# Patient Record
(demographics unavailable — no encounter records)

---

## 2018-08-30 NOTE — EMERGENCY ROOM REPORT
History of Present Illness


General


Chief Complaint:  General Complaint


Source:  Patient





Present Illness


HPI


57-year-old male, history of some psychiatric disorder, said that he woke up 

and he believes that someone put formaldehyde in his mouth.  He was at home, 

with a family member.  States that his dad  2 weeks ago, they used 

formaldehyde for his body, he says that he woke up and he smelled formaldehyde 

on his lips.  He says that he does not believe anyone poisoned him, but does 

not know why it would smelling formaldehyde in his lips.  He is currently just 

complaining of generalized weakness.  No chest pain or abdominal pain.  Some 

nausea but no vomiting.  No diarrhea.


Allergies:  


Coded Allergies:  


     No Known Allergies (Unverified , 18)





Patient History


Past Medical History:  see triage record


Past Surgical History:  none


Pertinent Family History:  none


Reviewed Nursing Documentation:  PMH: Agreed; PSxH: Agreed





Nursing Documentation-PM


Past Medical History:  No History, Except For


History Of Psychiatric Problem:  Yes - anxiety, depression


Hx Neurological Problems:  Yes





Review of Systems


All Other Systems:  negative except mentioned in HPI





Physical Exam





Vital Signs








  Date Time  Temp Pulse Resp B/P (MAP) Pulse Ox O2 Delivery O2 Flow Rate FiO2


 


18 18:48 98.4 84 16 108/76 100 Room Air  





 98.4       








Sp02 EP Interpretation:  reviewed, normal


General Appearance:  other - Withdrawn middle aged male, very anxious appearing

, however he is answering questions appropriately, nontoxic


Head:  normocephalic, atraumatic


Eyes:  bilateral eye normal inspection, bilateral eye PERRL, bilateral eye EOMI


ENT:  normal ENT inspection, normal pharynx, normal voice, moist mucus membranes


Neck:  normal inspection, full range of motion, supple


Respiratory:  normal inspection, lungs clear, normal breath sounds, no 

respiratory distress, no retraction, no wheezing, speaking full sentences, 

chest symmetrical


Cardiovascular #1:  normal inspection, regular rate, rhythm, no edema, normal 

capillary refill


Cardiovascular #2:  2+ radial (R), 2+ radial (L)


Gastrointestinal:  normal inspection, non tender, soft, non-distended, no 

guarding


Genitourinary:  no CVA tenderness


Musculoskeletal:  normal inspection, back normal, normal range of motion, non-

tender


Neurologic:  normal inspection, alert, oriented x3, responsive, motor strength/

tone normal, sensory intact, normal gait, speech normal


Psychiatric:  normal inspection, judgement/insight normal, memory normal


Skin:  normal inspection, normal color, no rash, warm/dry, well hydrated, 

normal turgor





Medical Decision Making


Diagnostic Impression:  


 Primary Impression:  


 Generalized weakness


 Additional Impressions:  


 Dehydration


 Decreased oral intake


ER Course


57-year-old male, feeling like he has smoke formaldehyde on his lips, however 

denies having any formaldehyde or any substance like that in his home.  No SI 

or HI





DDX:


Possibly anxiety, rule out toxic overdose although there denies any actual 

history of this





Plan:


Obtain labs, observation





ER course:


Patient has remained stable during ED stay.


He was very anxious upon arrival, so 2 mg Ativan was given


Since then he has been sleeping comfortably


Labs on her otherwise unremarkable with the exception of some mild dehydration


His mother came, says that he has a history of candida and she read an article 

where Candida can cause acidosis in the blood, so she gave him baking soda 

mixed with water this was 24 hours ago


labs are showing dehydration








Disposition:


Patient is to be admitted to Dr Real, med surg








Please note that this Emergency Department Report was dictated using RE[employer] technology software, occasionally this can lead to 

erroneous entry secondary to interpretation by the dictation equipment





Laboratory Tests








Test


  18


20:00 18


20:18


 


White Blood Count


  10.4 K/UL


(4.8-10.8) 


 


 


Red Blood Count


  3.47 M/UL


(4.70-6.10)  L 


 


 


Hemoglobin


  10.5 G/DL


(14.2-18.0)  L 


 


 


Hematocrit


  31.1 %


(42.0-52.0)  L 


 


 


Mean Corpuscular Volume 90 FL (80-99)   


 


Mean Corpuscular Hemoglobin


  30.1 PG


(27.0-31.0) 


 


 


Mean Corpuscular Hemoglobin


Concent 33.6 G/DL


(32.0-36.0) 


 


 


Red Cell Distribution Width


  11.5 %


(11.6-14.8)  L 


 


 


Platelet Count


  217 K/UL


(150-450) 


 


 


Mean Platelet Volume


  5.6 FL


(6.5-10.1)  L 


 


 


Neutrophils (%) (Auto)


  76.1 %


(45.0-75.0)  H 


 


 


Lymphocytes (%) (Auto)


  14.9 %


(20.0-45.0)  L 


 


 


Monocytes (%) (Auto)


  8.2 %


(1.0-10.0) 


 


 


Eosinophils (%) (Auto)


  0.1 %


(0.0-3.0) 


 


 


Basophils (%) (Auto)


  0.7 %


(0.0-2.0) 


 


 


Sodium Level


  141 MMOL/L


(136-145) 


 


 


Potassium Level


  3.2 MMOL/L


(3.5-5.1)  L 


 


 


Chloride Level


  102 MMOL/L


() 


 


 


Carbon Dioxide Level


  35 MMOL/L


(21-32)  H 


 


 


Anion Gap


  4 mmol/L


(5-15)  L 


 


 


Blood Urea Nitrogen


  23 mg/dL


(7-18)  H 


 


 


Creatinine


  0.7 MG/DL


(0.55-1.30) 


 


 


Estimate Glomerular


Filtration Rate > 60 mL/min


(>60) 


 


 


Glucose Level


  107 MG/DL


()  H 


 


 


Calcium Level


  9.0 MG/DL


(8.5-10.1) 


 


 


Total Bilirubin


  1.3 MG/DL


(0.2-1.0)  H 


 


 


Direct Bilirubin


  0.2 MG/DL


(0.0-0.3) 


 


 


Aspartate Amino Transferase


(AST) 55 U/L (15-37)


H 


 


 


Alanine Aminotransferase (ALT)


  11 U/L (12-78)


L 


 


 


Alkaline Phosphatase


  63 U/L


() 


 


 


Total Creatine Kinase


  1130 U/L


()  H 


 


 


Total Protein


  6.8 G/DL


(6.4-8.2) 


 


 


Albumin


  3.4 G/DL


(3.4-5.0) 


 


 


Globulin 3.4 g/dL   


 


Albumin/Globulin Ratio 1.0 (1.0-2.7)   


 


Salicylates Level


  1.5 ug/mL


(2.8-20)  L 


 


 


Acetaminophen Level


  < 2 MCG/ML


(10-30)  L 


 


 


Serum Alcohol < 3 mg/dL   


 


Arterial Blood pH


  


  7.542


(7.350-7.450)


 


Arterial Blood Partial


Pressure CO2 


  36.2 mmHg


(35.0-45.0)


 


Arterial Blood Partial


Pressure O2 


  102.9 mmHg


(75.0-100.0)  H


 


Arterial Blood HCO3


  


  30.4 mmol/L


(22.0-26.0)  H


 


Arterial Blood Oxygen


Saturation 


  97.4 %


(92.0-98.0)


 


Arterial Blood Base Excess  7.5  


 


Mart Test  Positive  











Last Vital Signs








  Date Time  Temp Pulse Resp B/P (MAP) Pulse Ox O2 Delivery O2 Flow Rate FiO2


 


18 19:10 98.4 85 16 117/67 100 Room Air  





 98.4       








Disposition:  ADMITTED AS INPATIENT


Condition:  Serious


Referrals:  


NOT CHOSEN IPA/MD,REFERRING (PCP)











Barrington Carmen M.D. Aug 30, 2018 19:33

## 2018-08-31 NOTE — CONSULTATION
History of Present Illness


General


Date patient seen:  Aug 31, 2018


Chief Complaint:  General Complaint





Present Illness


HPI


57-year-old male, history of some psychiatric disorder, said that he woke up 

and he believes that someone put formaldehyde in his mouth.    He says that he 

does not believe anyone poisoned him, but does not know why it would smelling 

formaldehyde in his lips.  He is currently just complaining of generalized 

weakness.  He was mildly dehydrated, and he seemed not be able to take care of 

himself and need probably placement.


Allergies:  


Coded Allergies:  


     No Known Allergies (Unverified , 8/30/18)





Medication History


Scheduled


Carbidopa/Levodopa  Mg* (Sinemet  Mg Tablet*), 1 TAB ORAL THREE 

TIMES A DAY, (Reported)





Miscellaneous Medications


Unable to Obtain Medications (Unable To Obtain Meds), (Reported)





Patient History


Healthcare decision maker





Resuscitation status


Full Code


Advanced Directive on File








Past Medical/Surgical History


Past Medical/Surgical History:  


(1) Schizoaffective disorder





Review of Systems


All Other Systems:  negative except mentioned in HPI





Physical Exam


General Appearance:  cachetic


Lines, tubes and drains:  peripheral


Neck:  non-tender, normal alignment


Respiratory/Chest:  chest wall non-tender, normal breath sounds


Breasts:  no masses


Cardiovascular/Chest:  normal peripheral pulses


Abdomen:  normal bowel sounds, hyperactive bowel sounds


Extremities:  normal range of motion





Last 24 Hour Vital Signs








  Date Time  Temp Pulse Resp B/P (MAP) Pulse Ox O2 Delivery O2 Flow Rate FiO2


 


8/31/18 12:00 97.9 73 18 111/67 (82) 99   





 97.9       


 


8/31/18 08:00 97.5 79 19 110/68 (82) 98   





 97.5       


 


8/31/18 08:00      Room Air  


 


8/31/18 04:00 98.9 73 19 113/58 (76) 98   





 98.9       


 


8/31/18 00:00 98.3 77 19 96/55 (69) 97   





 98.3       


 


8/30/18 23:22      Room Air  


 


8/30/18 23:11 98.1 82 16 117/71 100 Room Air  





 98.1       


 


8/30/18 22:45 98.1 82 16 117/71 100 Room Air  





 98.1       


 


8/30/18 20:14 98.5 80 18 120/68 100 Room Air  





 98.5       


 


8/30/18 19:10 98.4 85 16 117/67 100 Room Air  





 98.4       


 


8/30/18 18:48 98.4 84 16 108/76 100 Room Air  





 98.4       

















Intake and Output  


 


 8/30/18 8/31/18





 19:00 07:00


 


Intake Total  1600 ml


 


Output Total  500 ml


 


Balance  1100 ml


 


  


 


Intake IV Total  1600 ml


 


Output Urine Total  500 ml


 


# Voids  1











Laboratory Tests








Test


  8/30/18


20:00 8/30/18


20:18 8/31/18


05:50


 


White Blood Count


  10.4 K/UL


(4.8-10.8) 


  6.6 K/UL


(4.8-10.8)


 


Red Blood Count


  3.47 M/UL


(4.70-6.10)  L 


  3.40 M/UL


(4.70-6.10)  L


 


Hemoglobin


  10.5 G/DL


(14.2-18.0)  L 


  10.2 G/DL


(14.2-18.0)  L


 


Hematocrit


  31.1 %


(42.0-52.0)  L 


  30.2 %


(42.0-52.0)  L


 


Mean Corpuscular Volume 90 FL (80-99)    89 FL (80-99)  


 


Mean Corpuscular Hemoglobin


  30.1 PG


(27.0-31.0) 


  29.9 PG


(27.0-31.0)


 


Mean Corpuscular Hemoglobin


Concent 33.6 G/DL


(32.0-36.0) 


  33.7 G/DL


(32.0-36.0)


 


Red Cell Distribution Width


  11.5 %


(11.6-14.8)  L 


  11.5 %


(11.6-14.8)  L


 


Platelet Count


  217 K/UL


(150-450) 


  186 K/UL


(150-450)


 


Mean Platelet Volume


  5.6 FL


(6.5-10.1)  L 


  5.8 FL


(6.5-10.1)  L


 


Neutrophils (%) (Auto)


  76.1 %


(45.0-75.0)  H 


  71.8 %


(45.0-75.0)


 


Lymphocytes (%) (Auto)


  14.9 %


(20.0-45.0)  L 


  18.4 %


(20.0-45.0)  L


 


Monocytes (%) (Auto)


  8.2 %


(1.0-10.0) 


  8.4 %


(1.0-10.0)


 


Eosinophils (%) (Auto)


  0.1 %


(0.0-3.0) 


  0.8 %


(0.0-3.0)


 


Basophils (%) (Auto)


  0.7 %


(0.0-2.0) 


  0.7 %


(0.0-2.0)


 


Sodium Level


  141 MMOL/L


(136-145) 


  142 MMOL/L


(136-145)


 


Potassium Level


  3.2 MMOL/L


(3.5-5.1)  L 


  3.2 MMOL/L


(3.5-5.1)  L


 


Chloride Level


  102 MMOL/L


() 


  106 MMOL/L


()


 


Carbon Dioxide Level


  35 MMOL/L


(21-32)  H 


  32 MMOL/L


(21-32)


 


Anion Gap


  4 mmol/L


(5-15)  L 


  4 mmol/L


(5-15)  L


 


Blood Urea Nitrogen


  23 mg/dL


(7-18)  H 


  15 mg/dL


(7-18)


 


Creatinine


  0.7 MG/DL


(0.55-1.30) 


  0.7 MG/DL


(0.55-1.30)


 


Estimat Glomerular Filtration


Rate > 60 mL/min


(>60) 


  > 60 mL/min


(>60)


 


Glucose Level


  107 MG/DL


()  H 


  153 MG/DL


()  H


 


Calcium Level


  9.0 MG/DL


(8.5-10.1) 


  8.4 MG/DL


(8.5-10.1)  L


 


Total Bilirubin


  1.3 MG/DL


(0.2-1.0)  H 


  1.3 MG/DL


(0.2-1.0)  H


 


Direct Bilirubin


  0.2 MG/DL


(0.0-0.3) 


  0.2 MG/DL


(0.0-0.3)


 


Aspartate Amino Transf


(AST/SGOT) 55 U/L (15-37)


H 


  43 U/L (15-37)


H


 


Alanine Aminotransferase


(ALT/SGPT) 11 U/L (12-78)


L 


  23 U/L (12-78)


 


 


Alkaline Phosphatase


  63 U/L


() 


  56 U/L


()


 


Total Creatine Kinase


  1130 U/L


()  H 


  


 


 


Total Protein


  6.8 G/DL


(6.4-8.2) 


  5.9 G/DL


(6.4-8.2)  L


 


Albumin


  3.4 G/DL


(3.4-5.0) 


  2.8 G/DL


(3.4-5.0)  L


 


Globulin 3.4 g/dL    3.1 g/dL  


 


Albumin/Globulin Ratio


  1.0 (1.0-2.7)  


  


  0.9 (1.0-2.7)


L


 


Salicylates Level


  1.5 ug/mL


(2.8-20)  L 


  


 


 


Acetaminophen Level


  < 2 MCG/ML


(10-30)  L 


  


 


 


Serum Alcohol < 3 mg/dL    


 


Arterial Blood pH


  


  7.542


(7.350-7.450) 


 


 


Arterial Blood Partial


Pressure CO2 


  36.2 mmHg


(35.0-45.0) 


 


 


Arterial Blood Partial


Pressure O2 


  102.9 mmHg


(75.0-100.0)  H 


 


 


Arterial Blood HCO3


  


  30.4 mmol/L


(22.0-26.0)  H 


 


 


Arterial Blood Oxygen


Saturation 


  97.4 %


(92.0-98.0) 


 


 


Arterial Blood Base Excess  7.5   


 


Mart Test  Positive   


 


Phosphorus Level


  


  


  2.6 MG/DL


(2.5-4.9)


 


Magnesium Level


  


  


  2.0 MG/DL


(1.8-2.4)








Height (Feet):  5


Height (Inches):  6.00


Weight (Pounds):  129


Medications





Current Medications








 Medications


  (Trade)  Dose


 Ordered  Sig/Juno


 Route


 PRN Reason  Start Time


 Stop Time Status Last Admin


Dose Admin


 


 Acetaminophen


  (Tylenol)  650 mg  Q6H  PRN


 ORAL


 Mild Pain/Temp > 100.0  8/31/18 00:15


 9/30/18 00:14   


 


 


 Acetaminophen/


 Hydrocodone Bitart


  (Norco 5/325)  1 tab  Q6H  PRN


 ORAL


 MOD-SEV PAIN 4-10  8/31/18 00:15


 9/7/18 00:14   


 


 


 Dextrose/


 Electrolytes  1,000 ml @ 


 100 mls/hr  Q10H


 IV


   8/31/18 00:15


 9/30/18 00:14  8/31/18 11:05


 


 


 Heparin Sodium


  (Porcine)


  (Heparin 5000


 units/ml)  5,000 units  EVERY 12  HOURS


 SUBQ


   8/31/18 09:00


 9/30/18 08:59  8/31/18 08:07


 


 


 Lorazepam


  (Ativan)  1 mg  Q6H  PRN


 ORAL


 For Anxiety  8/31/18 11:30


 9/7/18 11:29   


 


 


 Ondansetron HCl


  (Zofran)  4 mg  Q4H  PRN


 IVP


 Nausea & Vomiting  8/31/18 00:15


 9/30/18 00:14   


 











Assessment/Plan


Problem List:  


(1) Dehydration


ICD Codes:  E86.0 - Dehydration


SNOMED:  53540226


(2) unable to care for himself 


(3) Schizoaffective disorder


ICD Codes:  F25.9 - Schizoaffective disorder, unspecified


SNOMED:  98418444


(4) Generalized weakness


ICD Codes:  R53.1 - Weakness


SNOMED:  14297943


Assessment/Plan


Iv fluids


psych evaluation


check electrolytes


K supplement


pt/ot 


social service consult











Bala Wisdom MD Aug 31, 2018 12:38

## 2018-08-31 NOTE — HISTORY & PHYSICAL
History and Physical


History & Physicial


Dictated for Int Med-Dr Real no. [id number].











Pritesh Samuels MD Aug 31, 2018 20:04

## 2018-08-31 NOTE — HISTORY AND PHYSICAL REPORT
DATE OF ADMISSION:  2018



CHIEF COMPLAINT:  The patient is a 57-year-old male, who presents with a

chief complaint of acute psychosis.



HISTORY OF PRESENT ILLNESS:  The patient himself is unable to contribute

much to the history and physical.  The patient is having hallucinations.

The patient apparently lives at home with family.  The patient states his

father  2 weeks ago and they used formaldehyde to preserve his body.

The patient states he woke up with formaldehyde on his lips.  The patient

states that he does not know if of anybody poisoned him or if he just got

formaldehyde on his lips.  The patient is admitted for acute psychosis.



PAST MEDICAL HISTORY:  Unknown.



PAST SURGICAL HISTORY:  Unknown.



CURRENT MEDICATIONS:  Sinemet 25/100 one tablet p.o. 3 times daily.



ALLERGIES:  No known drug allergies.



SOCIAL HISTORY:  The patient lives at home with his family.  The patient

denies tobacco or alcohol use.



REVIEW OF SYSTEMS:  Unable to assess secondary to the patient's mental

status.



PHYSICAL EXAMINATION:

VITAL SIGNS:  Temperature 98.3, respirations 19, pulse 77, and blood

pressure 96/55.

GENERAL:  The patient is a well-developed and well-nourished male, in no

apparent distress.

HEENT:  Eyes, pupils are equal and responsive to light and accommodation.

Extraocular movements are intact.

NECK:  Supple without lymphadenopathy.

CHEST:  Lungs are clear to auscultation bilaterally without wheezes or

rales.

CARDIOVASCULAR:  Regular rhythm and rate.  S1 and S2 are normal without

murmurs, rubs, or gallops.

ABDOMEN:  Soft, nontender, and nondistended.  Positive bowel sounds.  No

evidence of hepatosplenomegaly.  Currently, no rebounding or guarding

noted.

EXTREMITIES:  Negative for clubbing, cyanosis, or edema.

RECTAL/GENITAL:  Deferred.

NEUROLOGIC:  Cranial nerves II through XII are grossly intact without focal

deficits.  Motor strength is 5/5 bilaterally.  Deep tendon reflexes are 2+

plantar.



LABORATORY STUDIES:  WBC 10.4, hemoglobin 10.5, hematocrit 31.1, and

platelets 270,000.  Sodium 141, potassium 3.2, chloride 102, CO2 35, BUN

23, creatinine 0.7, and glucose 107.  Urine toxicology was negative.



ASSESSMENT:  This is a 57-year-old male.



1. Acute psychosis, not otherwise specified.

2. Parkinson's disease.



TREATMENT:

1. Psychosis, not otherwise specified.  A Psychiatric consultation has

been obtained with Dr. Lima.  We will follow recommendations of Dr. Lima.

2. Parkinson's disease.  Continue Sinemet as above this.









  ______________________________________________

  Pritesh Samuels M.D.





DR:  VICTORIA

D:  2018 20:03

T:  2018 21:15

JOB#:  [id number]

CC:

## 2018-09-01 NOTE — CONSULTATION
History of Present Illness


General


Date patient seen:  Aug 31, 2018


Chief Complaint:  General Complaint





Present Illness


HPI


57-year-old male, who presents with a chief complaint of acute psychosis. the 

pt has hx of depression and anxiety he was perseverating about his medical 

condition and was paranoid


Allergies:  


Uncoded Allergies:  


     LEVODOPA, CARBIDOPA (Allergy, Unknown, 9/1/18)





Medication History


Scheduled


Albuterol Sulfate* (Proair Hfa*), 1 PUFF INH THREE TIMES A DAY, (Reported)


Carbidopa/Levodopa  Mg* (Sinemet  Mg Tablet*), 1 TAB ORAL THREE 

TIMES A DAY, (Reported)


Carvedilol* (Carvedilol*), 6.25 MG ORAL EVERY 12 HOURS, (Reported)


Clonazepam* (Klonopin*), 0.5 MG ORAL BID, (Reported)


Docusate Sodium* (Docusate Sodium*), 100 MG ORAL TWICE A DAY, (Reported)


Entacapone (Entacapone), 200 MG PO DAILY, (Reported)


Fluticasone Propionate (Fluticasone Propionate), 1 SPRAY NASAL DAILY, (Reported)


Fluticasone Propionate* (Fluticasone Propionate*), 1 SPRAY NASAL DAILY, (

Reported)


Pantoprazole* (Pantoprazole*), 40 MG ORAL DAILY, (Reported)


Quetiapine Fumarate* (Quetiapine Fumarate*), 50 MG ORAL DAILY, (Reported)





Scheduled PRN


Hydrocodone Bit/Acetaminophen 5-325* (Norco 5-325*), 1 TAB ORAL Q6H PRN for For 

Pain, (Reported)


Methocarbamol* (Methocarbamol*), 750 MG ORAL THREE TIMES A DAY PRN for For Pain,

 (Reported)





Miscellaneous Medications


Unable to Obtain Medications (Unable To Obtain Meds), (Reported)





Patient History


History Provided By:  Patient, Medical Record, PMD


Healthcare decision maker





Resuscitation status


Full Code


Advanced Directive on File








Review of Systems


Psychiatric:  Reports: prior hx, anxiety, depressed feelings, emotional problems





Physical Exam


General Appearance:  no apparent distress, alert





Last 24 Hour Vital Signs








  Date Time  Temp Pulse Resp B/P (MAP) Pulse Ox O2 Delivery O2 Flow Rate FiO2


 


9/1/18 12:00 98.2 75 18 115/70 (85) 99   





 98.2       


 


9/1/18 08:12      Room Air  


 


9/1/18 08:00 99.0 76 18 103/67 (79) 99   





 99.0       


 


9/1/18 04:00 98.2 80 18 127/75 (92) 99   





 98.2       


 


9/1/18 00:00 98.2 79 18 120/72 (88) 99   





 98.2       

















Intake and Output  


 


 8/31/18 9/1/18





 19:00 07:00


 


Intake Total  490 ml


 


Output Total  950 ml


 


Balance  -460 ml


 


  


 


Intake Oral  490 ml


 


Output Urine Total  950 ml


 


# Voids  2


 


# Bowel Movements 2 











Laboratory Tests








Test


  9/1/18


07:20


 


White Blood Count


  4.8 K/UL


(4.8-10.8)


 


Red Blood Count


  3.92 M/UL


(4.70-6.10)  L


 


Hemoglobin


  11.5 G/DL


(14.2-18.0)  L


 


Hematocrit


  34.5 %


(42.0-52.0)  L


 


Mean Corpuscular Volume 88 FL (80-99)  


 


Mean Corpuscular Hemoglobin


  29.5 PG


(27.0-31.0)


 


Mean Corpuscular Hemoglobin


Concent 33.4 G/DL


(32.0-36.0)


 


Red Cell Distribution Width


  11.3 %


(11.6-14.8)  L


 


Platelet Count


  213 K/UL


(150-450)


 


Mean Platelet Volume


  5.8 FL


(6.5-10.1)  L


 


Neutrophils (%) (Auto)


  57.2 %


(45.0-75.0)


 


Lymphocytes (%) (Auto)


  29.0 %


(20.0-45.0)


 


Monocytes (%) (Auto)


  11.6 %


(1.0-10.0)  H


 


Eosinophils (%) (Auto)


  1.3 %


(0.0-3.0)


 


Basophils (%) (Auto)


  0.8 %


(0.0-2.0)


 


Sodium Level


  141 MMOL/L


(136-145)


 


Potassium Level


  3.6 MMOL/L


(3.5-5.1)


 


Chloride Level


  104 MMOL/L


()


 


Carbon Dioxide Level


  29 MMOL/L


(21-32)


 


Anion Gap


  8 mmol/L


(5-15)


 


Blood Urea Nitrogen


  10 mg/dL


(7-18)


 


Creatinine


  0.6 MG/DL


(0.55-1.30)


 


Estimat Glomerular Filtration


Rate > 60 mL/min


(>60)


 


Glucose Level


  100 MG/DL


()


 


Calcium Level


  8.9 MG/DL


(8.5-10.1)


 


Phosphorus Level


  2.7 MG/DL


(2.5-4.9)


 


Magnesium Level


  2.0 MG/DL


(1.8-2.4)


 


Total Bilirubin


  0.9 MG/DL


(0.2-1.0)


 


Aspartate Amino Transf


(AST/SGOT) 39 U/L (15-37)


H


 


Alanine Aminotransferase


(ALT/SGPT) 14 U/L (12-78)


 


 


Alkaline Phosphatase


  59 U/L


()


 


Total Protein


  6.7 G/DL


(6.4-8.2)


 


Albumin


  3.1 G/DL


(3.4-5.0)  L


 


Globulin 3.6 g/dL  


 


Albumin/Globulin Ratio


  0.9 (1.0-2.7)


L








Height (Feet):  5


Height (Inches):  6.00


Weight (Pounds):  129





Assessment/Plan


Assessment/Plan


encephalopathy due to GMC





seroquel Kristian Wilkins MD Sep 1, 2018 23:32

## 2018-09-01 NOTE — PULMONOLOGY PROGRESS NOTE
Assessment/Plan


Assessment/Plan


 


ASSESSMENT


Dehydration 


Schizoaffective disorder 


Unable to care for himself 


Anemia 


Hypokalemia 


Probably protein calorie malnutrition 








PLAN OF CARE


MS floor


IVF


Monitor renal parameters, lytes, correct lytes as needed


Avoid nephrotoxic 


PT eval


Dietary eval 


Monitor HH with goal to keep Hgb above 7


Psych eval  


SS eval     





case discussed and evaluated by supervising physician





Subjective


Allergies:  


Coded Allergies:  


     No Known Allergies (Unverified , 8/30/18)


Subjective


denies chest pain, SOB


reports generalized weakness 


poor historian





Objective





Last 24 Hour Vital Signs








  Date Time  Temp Pulse Resp B/P (MAP) Pulse Ox O2 Delivery O2 Flow Rate FiO2


 


9/1/18 04:00 98.2 80 18 127/75 (92) 99   





 98.2       


 


9/1/18 00:00 98.2 79 18 120/72 (88) 99   





 98.2       


 


8/31/18 21:00      Room Air  


 


8/31/18 20:00 97.9 82  113/63 (80)    





 97.9       


 


8/31/18 16:00 98.0 79 19 121/70 (87) 99   





 98.0       


 


8/31/18 12:00 97.9 73 18 111/67 (82) 99   





 97.9       


 


8/31/18 08:00 97.5 79 19 110/68 (82) 98   





 97.5       


 


8/31/18 08:00      Room Air  

















Intake and Output  


 


 8/31/18 9/1/18





 19:00 07:00


 


Intake Total  490 ml


 


Output Total  950 ml


 


Balance  -460 ml


 


  


 


Intake Oral  490 ml


 


Output Urine Total  950 ml


 


# Voids  2


 


# Bowel Movements 2 








General Appearance:  no acute distress


HEENT:  normocephalic, atraumatic, anicteric, mucous membranes moist


Respiratory/Chest:  lungs clear, no respiratory distress, no accessory muscle 

use


Cardiovascular:  normal rate, regular rhythm, no JVD


Abdomen:  normal bowel sounds, soft, non tender


Genitourinary:  normal external genitalia


Extremities:  no edema, pedal pulses normal


Neurologic/Psychiatric:  abnormal gait, alert, responsive


Musculoskeletal:  normal muscle bulk





Current Medications








 Medications


  (Trade)  Dose


 Ordered  Sig/Ujno


 Route


 PRN Reason  Start Time


 Stop Time Status Last Admin


Dose Admin


 


 Acetaminophen


  (Tylenol)  650 mg  Q6H  PRN


 ORAL


 Mild Pain/Temp > 100.0  8/31/18 00:15


 9/30/18 00:14   


 


 


 Acetaminophen/


 Hydrocodone Bitart


  (Norco 5/325)  1 tab  Q6H  PRN


 ORAL


 MOD-SEV PAIN 4-10  8/31/18 00:15


 9/7/18 00:14   


 


 


 Al Hydroxide/Mg


 Hydroxide


  (Mylanta)  30 ml  Q8HR  PRN


 ORAL


 Abdominal cramps  9/1/18 02:30


 10/1/18 02:29  9/1/18 04:30


 


 


 Heparin Sodium


  (Porcine)


  (Heparin 5000


 units/ml)  5,000 units  EVERY 12  HOURS


 SUBQ


   8/31/18 09:00


 9/30/18 08:59  8/31/18 21:04


 


 


 Lorazepam


  (Ativan)  1 mg  Q6H  PRN


 ORAL


 For Anxiety  8/31/18 11:30


 9/7/18 11:29  8/31/18 14:10


 


 


 Ondansetron HCl


  (Zofran)  4 mg  Q4H  PRN


 IVP


 Nausea & Vomiting  8/31/18 00:15


 9/30/18 00:14   


 

















Danica Lacey NP Sep 1, 2018 07:13

## 2018-09-01 NOTE — EMERGENCY ROOM REPORT
History of Present Illness


General


Chief Complaint:  General Complaint


Source:  EMS





Present Illness


HPI


57-year-old male presents ED for evaluation.  Patient brought by EMS from home.

  Patient was having anxiety and call 911.  States he is shaking.  EMS states 

that they have responded to this patient multiple times in the past week.  

History of Parkinson's.  Patient was admitted here 2 days ago and left AMA this 

morning.  Upon arrival patient is very anxious.  States his sugar is very high.

  Denies chest pain or shortness of breath.  Denies hearing voices.  Denies 

suicidal or homicidal ideation.  No other aggravating relieving factors.  

Denies any other associated symptoms


Allergies:  


Coded Allergies:  


     NO KNOWN ALLERGIES (Verified  Allergy, Unknown, 9/1/18)





Patient History


Past Medical History:  psych hx, other - parkinsons


Past Surgical History:  none


Pertinent Family History:  none


Social History:  Denies: smoking, alcohol use, drug use


Immunizations:  UTD


Reviewed Nursing Documentation:  PMH: Agreed; PSxH: Agreed





Nursing Documentation-PMH


Past Medical History:  No History, Except For


Hx Neurological Problems:  Yes





Review of Systems


All Other Systems:  negative except mentioned in HPI





Physical Exam





Vital Signs








  Date Time  Temp Pulse Resp B/P (MAP) Pulse Ox O2 Delivery O2 Flow Rate FiO2


 


9/1/18 18:39 99.0 86 20 132/76 97 Room Air  





 99.0       








Sp02 EP Interpretation:  reviewed, normal


General Appearance:  no apparent distress, alert, GCS 15, non-toxic


Head:  normocephalic, atraumatic


Eyes:  bilateral eye normal inspection, bilateral eye PERRL


ENT:  hearing grossly normal, normal pharynx, no angioedema, normal voice


Neck:  full range of motion, supple/symm/no masses


Respiratory:  chest non-tender, lungs clear, normal breath sounds, speaking 

full sentences


Cardiovascular #1:  regular rate, rhythm, no edema


Cardiovascular #2:  2+ carotid (R), 2+ carotid (L), 2+ radial (R), 2+ radial (L)

, 2+ dorsalis pedis (R), 2+ dorsalis pedis (L)


Gastrointestinal:  normal bowel sounds, non tender, soft, non-distended, no 

guarding, no rebound


Rectal:  deferred


Genitourinary:  normal inspection, no CVA tenderness


Musculoskeletal:  back normal, gait/station normal, normal range of motion, non-

tender


Neurologic:  alert, oriented x3, responsive, motor strength/tone normal, 

sensory intact, speech normal


Psychiatric:  no suicidal/homicidal ideation, no delusions, anxious


Reflexes:  3+ bicep (R), 3+ bicep (L), 3+ tricep (R), 3+ tricep (L), 3+ knee (R)

, 3+ knee (L)


Skin:  normal color, no rash, warm/dry, well hydrated


Lymphatic:  no adenopathy





Medical Decision Making


Diagnostic Impression:  


 Primary Impression:  


 Schizoaffective disorder


 Qualified Codes:  F25.9 - Schizoaffective disorder, unspecified


 Additional Impression:  


 unable to care for himself


ER Course


Hospital Course 


57-year-old male presenting to ED with anxiety, unable to care for self





Differential diagnoses include: UTI, sepsis, dehydration, psychosis, failure to 

thrive





Clinical course


Patient placed on stretcher.  On cardiac monitor.  After initial history and 

physical, I ordered labs, IV fluids, ativan





Labs - no leukocytosis, hb/hct stable, electrolytes ok





Patient is more calm after Ativan however is unable to provide a accurate 

story.  Patient was admitted here on 8/30.  I spoke to the admitting physician 

who states that patient did leave AMA on 9/1 morning.  Called his mother who 

picked him up.





I spoke to the mother over the phone.  She did not want to take the patient 

home but he insisted.  She states that she does not believe patient can be 

safely discharged to home.  She agrees to admission





patient will be admitted to Dr Real





I feel this is a highly complex case requiring extensive working including EKG/

Rhythm strip, Xray/CT/US, Blood/urine lab work, repeat exams while in ED, and 

administration of strong opiates/narcotics for pain control, admission to 

hospital or close patient follow up.  





Diagnosis - schizoaffective disorder, unable to care for self





Patient admitted to floor in serious  condition





Labs








Test


  9/1/18


19:22 9/2/18


05:20


 


White Blood Count


  8.3 K/UL


(4.8-10.8) 6.7 K/UL


(4.8-10.8)


 


Red Blood Count


  3.82 M/UL


(4.70-6.10) 3.85 M/UL


(4.70-6.10)


 


Hemoglobin


  11.2 G/DL


(14.2-18.0) 11.3 G/DL


(14.2-18.0)


 


Hematocrit


  33.4 %


(42.0-52.0) 33.8 %


(42.0-52.0)


 


Mean Corpuscular Volume 87 FL (80-99)  88 FL (80-99) 


 


Mean Corpuscular Hemoglobin


  29.4 PG


(27.0-31.0) 29.4 PG


(27.0-31.0)


 


Mean Corpuscular Hemoglobin


Concent 33.7 G/DL


(32.0-36.0) 33.5 G/DL


(32.0-36.0)


 


Red Cell Distribution Width


  11.3 %


(11.6-14.8) 11.3 %


(11.6-14.8)


 


Platelet Count


  258 K/UL


(150-450) 239 K/UL


(150-450)


 


Mean Platelet Volume


  5.5 FL


(6.5-10.1) 5.8 FL


(6.5-10.1)


 


Neutrophils (%) (Auto)


  71.5 %


(45.0-75.0) 64.9 %


(45.0-75.0)


 


Lymphocytes (%) (Auto)


  16.1 %


(20.0-45.0) 21.1 %


(20.0-45.0)


 


Monocytes (%) (Auto)


  10.7 %


(1.0-10.0) 12.2 %


(1.0-10.0)


 


Eosinophils (%) (Auto)


  0.6 %


(0.0-3.0) 1.2 %


(0.0-3.0)


 


Basophils (%) (Auto)


  1.0 %


(0.0-2.0) 0.6 %


(0.0-2.0)


 


Sodium Level


  138 MMOL/L


(136-145) 139 MMOL/L


(136-145)


 


Potassium Level


  3.8 MMOL/L


(3.5-5.1) 3.8 MMOL/L


(3.5-5.1)


 


Chloride Level


  103 MMOL/L


() 104 MMOL/L


()


 


Carbon Dioxide Level


  29 MMOL/L


(21-32) 26 MMOL/L


(21-32)


 


Anion Gap


  6 mmol/L


(5-15) 9 mmol/L


(5-15)


 


Blood Urea Nitrogen


  14 mg/dL


(7-18) 17 mg/dL


(7-18)


 


Creatinine


  0.7 MG/DL


(0.55-1.30) 0.6 MG/DL


(0.55-1.30)


 


Estimat Glomerular Filtration


Rate > 60 mL/min


(>60) > 60 mL/min


(>60)


 


Glucose Level


  166 MG/DL


() 112 MG/DL


()


 


Calcium Level


  9.0 MG/DL


(8.5-10.1) 9.2 MG/DL


(8.5-10.1)


 


Total Bilirubin


  0.5 MG/DL


(0.2-1.0) 0.5 MG/DL


(0.2-1.0)


 


Aspartate Amino Transf


(AST/SGOT) 33 U/L (15-37) 


  28 U/L (15-37) 


 


 


Alanine Aminotransferase


(ALT/SGPT) 21 U/L (12-78) 


  42 U/L (12-78) 


 


 


Alkaline Phosphatase


  63 U/L


() 62 U/L


()


 


Total Protein


  6.9 G/DL


(6.4-8.2) 7.1 G/DL


(6.4-8.2)


 


Albumin


  3.2 G/DL


(3.4-5.0) 3.3 G/DL


(3.4-5.0)


 


Globulin 3.7 g/dL  3.8 g/dL 


 


Albumin/Globulin Ratio 0.9 (1.0-2.7)  0.9 (1.0-2.7) 


 


Salicylates Level


  2.4 ug/mL


(2.8-20) 


 


 


Urine Opiates Screen


  Negative


(NEGATIVE) 


 


 


Acetaminophen Level


  < 2 MCG/ML


(10-30) 


 


 


Urine Barbiturates Screen


  Negative


(NEGATIVE) 


 


 


Phencyclidine (PCP) Screen


  Negative


(NEGATIVE) 


 


 


Urine Amphetamines Screen


  Negative


(NEGATIVE) 


 


 


Urine Benzodiazepines Screen


  Negative


(NEGATIVE) 


 


 


Urine Cocaine Screen


  Negative


(NEGATIVE) 


 


 


Urine Marijuana (THC) Screen


  Negative


(NEGATIVE) 


 


 


Serum Alcohol < 3 mg/dL  


 


Triglycerides Level


  


  41 MG/DL


()


 


Cholesterol Level


  


  109 MG/DL (<


200)


 


LDL Cholesterol


  


  65 mg/dL


(<100)


 


HDL Cholesterol


  


  41 MG/DL


(40-60)


 


Cholesterol/HDL Ratio  2.7 (3.3-4.4) 


 


Thyroid Stimulating Hormone


(TSH) 


  1.066 uiU/mL


(0.358-3.740)











Last Vital Signs








  Date Time  Temp Pulse Resp B/P (MAP) Pulse Ox O2 Delivery O2 Flow Rate FiO2


 


9/1/18 18:47 99.4 87 28 141/80 99 Room Air  





 99.4       








Status:  improved


Disposition:  ADMITTED AS INPATIENT


Condition:  Serious


Referrals:  


Salvador Real MD (PCP)











Tacho Weber MD Sep 1, 2018 19:39
(0) understands/communicates without difficulty

## 2018-09-02 NOTE — HISTORY AND PHYSICAL REPORT
DATE OF ADMISSION:  09/01/2018



CHIEF COMPLAINT:  The patient is a 57-year-old white male who presents with

chief complaint of panic attack.



HISTORY OF PRESENT ILLNESS:  The patient was apparently admitted to [facility] couple of days ago.  The patient left against medical

advice.



The patient became extremely anxious on 09/01/2018.  The patient called

EMS.  The  patient was transferred to [facility].  The patient

is admitted for acute panic attack.



PAST MEDICAL HISTORY:  Parkinson disease.



PAST SURGICAL HISTORY:  The patient denies.



CURRENT MEDICATIONS:  Sinemet 25/100 mg one tablet p.o. 3 times daily.



ALLERGIES:  No known drug allergies.



SOCIAL HISTORY:  The patient lives at home.  The patient denies tobacco or

alcohol use.



REVIEW OF SYSTEMS:  Unable to assess secondary to the patient's mental

status.



PHYSICAL EXAMINATION:

GENERAL:  The patient is a thin appearing, disheveled white male, in no

apparent distress.

VITAL SIGNS:  Temperature 99.3 degrees, respirations 19, pulse 93, and

blood pressure 109/60.

HEENT:  Eyes, pupils equal and responsive to light and accommodation.

Extraocular movements are intact.

NECK:  Supple without lymphadenopathy.

CHEST:  Lungs are clear to auscultation bilaterally without wheezes or

rales.

CARDIOVASCULAR:  Regular rhythm and rate.  S1 and S2 normal without

murmurs, rubs, or gallops.

ABDOMEN:  Soft, nontender, and nondistended with positive bowel sounds.  No

evidence of hepatosplenomegaly.  Currently, no rebound or guarding

noted.

EXTREMITIES:  Negative for clubbing, cyanosis, or edema.

RECTAL:  Refused.

GENITAL:  Refused.

NEUROLOGIC:  Cranial nerves II through XII are grossly intact without focal

deficits.  Motor strength is 5/5 bilaterally.  Deep tendon reflexes are 2+

plantar.



LABORATORY AND DIAGNOSTIC DATA:  WBC 8.3, hemoglobin 11.2, hematocrit 33.4

and platelets 258,000.  Sodium 138, potassium 3.8, chloride 103, CO2 29,

BUN 14, creatinine 0.7 and glucose 166.



ASSESSMENT:  This is a 57-year-old white male



1. Panic disorder.

2. Parkinson disease.



TREATMENT:

1. Panic disorder.  The patient has been started empirically on

Klonopin.  A Psychiatric consultation has been obtained with Dr. Lima.

We will follow recommendation of Dr. Lima.

2. Parkinson disease.  Continue Sinemet as above.









  ______________________________________________

  Pritesh Samules M.D.





DR:  ABILIO

D:  09/02/2018 16:27

T:  09/02/2018 18:58

JOB#:  [id number]

CC:

## 2018-09-02 NOTE — CONSULTATION
History of Present Illness


General


Date patient seen:  Sep 2, 2018


Time patient seen:  10:00


Chief Complaint:  General Complaint


Referring physician:  dr Real


Reason for Consultation:  inpatient management





Present Illness


HPI


57-year-old male  with PMH of Parkinson disease, HTN, schizoaffective disorder, 

presented  ED for evaluation.  


Patient brought by EMS from home.  


Patient had anxiety, was shaking  and called 911.


Patient was admitted to [facility]  2 days ago and left AMA t 9/1 in am   


Upon arrival patient was very anxious.


He denied hearing voices, he denied SI/HI .


Upon evaluation VS were stable  


urine toxicology screen was negative  


labs work unremarkable except[t elevated glucose 166 , low albumin and mild 

anemia 


patient was admitted with Parkinson disease, schizoaffective disorder, unable 

to care for himself.


Allergies:  


Coded Allergies:  


     NO KNOWN ALLERGIES (Verified  Allergy, Unknown, 9/1/18)





Medication History


Scheduled


Albuterol Sulfate* (Proair Hfa*), 1 PUFF INH THREE TIMES A DAY, (Reported)


Carbidopa/Levodopa  Mg* (Sinemet  Mg Tablet*), 1 TAB ORAL THREE 

TIMES A DAY, (Reported)


Carvedilol* (Carvedilol*), 6.25 MG ORAL EVERY 12 HOURS, (Reported)


Clonazepam* (Klonopin*), 0.5 MG ORAL BID, (Reported)


Docusate Sodium* (Docusate Sodium*), 100 MG ORAL TWICE A DAY, (Reported)


Entacapone (Entacapone), 200 MG PO DAILY, (Reported)


Fluticasone Propionate (Fluticasone Propionate), 1 SPRAY NASAL DAILY, (Reported)


Fluticasone Propionate* (Fluticasone Propionate*), 1 SPRAY NASAL DAILY, (

Reported)


Pantoprazole* (Pantoprazole*), 40 MG ORAL DAILY, (Reported)


Quetiapine Fumarate* (Quetiapine Fumarate*), 50 MG ORAL DAILY, (Reported)





Scheduled PRN


Hydrocodone Bit/Acetaminophen 5-325* (Norco 5-325*), 1 TAB ORAL Q6H PRN for For 

Pain, (Reported)


Methocarbamol* (Methocarbamol*), 750 MG ORAL THREE TIMES A DAY PRN for For Pain,

 (Reported)





Miscellaneous Medications


Unable to Obtain Medications (Unable To Obtain Meds), (Reported)





Patient History


Healthcare decision maker





Resuscitation status


Full Code


Advanced Directive on File


No





Review of Systems


Constitutional:  Reports: weakness


Eye:  Reports: no symptoms


ENT:  Reports: no symptoms


Respiratory:  Reports: no symptoms


Cardiovascular:  Reports: no symptoms


Gastrointestinal:  Reports: no symptoms


Genitourinary:  Reports: no symptoms


Musculoskeletal:  Reports: muscle stiffness


Psychiatric:  Reports: see HPI


Neurological:  Reports: see HPI


Endocrine:  Reports: see HPI


Hematologic/Lymphatic:  Reports: anemia





Physical Exam


General Appearance:  alert, other - awake, alert, responsive, dysarthria


Lines, tubes and drains:  peripheral


HEENT:  normocephalic, atraumatic, anicteric, mucous membranes moist, PERRL


Neck:  non-tender, normal alignment


Respiratory/Chest:  chest wall non-tender, lungs clear, no respiratory distress

, no accessory muscle use


Cardiovascular/Chest:  normal rate, regular rhythm, no JVD


Abdomen:  normal bowel sounds, non tender, soft


Extremities:  non-tender, no calf tenderness, normal capillary refill, other - 

fine tremors kathya hands


Skin Exam:  warm/dry


Neurologic:  abnormal gait, alert, responsive, other - stiffness





Last 24 Hour Vital Signs








  Date Time  Temp Pulse Resp B/P (MAP) Pulse Ox O2 Delivery O2 Flow Rate FiO2


 


9/2/18 12:00 98.1 66 20 108/67 (81) 97   





 98.1       


 


9/2/18 10:08  79  131/85    


 


9/2/18 09:00      Room Air  


 


9/2/18 08:00 99.3 79 21 131/85 (100) 97   





 99.3       


 


9/2/18 04:00 98.6 78 20 118/69 (85) 98   





 98.6       


 


9/2/18 00:00 99.3 92 19 109/60 (76) 98   





 99.3       


 


9/1/18 22:16      Room Air  


 


9/1/18 21:46   13 102/63 100 Room Air  


 


9/1/18 21:03  75 13 102/63 100 Room Air  


 


9/1/18 18:47 99.4 87 28 141/80 99 Room Air  





 99.4       


 


9/1/18 18:39 99.0 86 20 132/76 97 Room Air  





 99.0       

















Intake and Output  


 


 9/1/18 9/2/18





 19:00 07:00


 


Intake Total  500 ml


 


Balance  500 ml


 


  


 


Intake IV Total  500 ml


 


# Voids  4


 


# Bowel Movements  2











Laboratory Tests








Test


  9/1/18


19:22 9/2/18


05:20


 


White Blood Count


  8.3 K/UL


(4.8-10.8)  # 6.7 K/UL


(4.8-10.8)


 


Red Blood Count


  3.82 M/UL


(4.70-6.10)  L 3.85 M/UL


(4.70-6.10)  L


 


Hemoglobin


  11.2 G/DL


(14.2-18.0)  L 11.3 G/DL


(14.2-18.0)  L


 


Hematocrit


  33.4 %


(42.0-52.0)  L 33.8 %


(42.0-52.0)  L


 


Mean Corpuscular Volume 87 FL (80-99)   88 FL (80-99)  


 


Mean Corpuscular Hemoglobin


  29.4 PG


(27.0-31.0) 29.4 PG


(27.0-31.0)


 


Mean Corpuscular Hemoglobin


Concent 33.7 G/DL


(32.0-36.0) 33.5 G/DL


(32.0-36.0)


 


Red Cell Distribution Width


  11.3 %


(11.6-14.8)  L 11.3 %


(11.6-14.8)  L


 


Platelet Count


  258 K/UL


(150-450) 239 K/UL


(150-450)


 


Mean Platelet Volume


  5.5 FL


(6.5-10.1)  L 5.8 FL


(6.5-10.1)  L


 


Neutrophils (%) (Auto)


  71.5 %


(45.0-75.0) 64.9 %


(45.0-75.0)


 


Lymphocytes (%) (Auto)


  16.1 %


(20.0-45.0)  L 21.1 %


(20.0-45.0)


 


Monocytes (%) (Auto)


  10.7 %


(1.0-10.0)  H 12.2 %


(1.0-10.0)  H


 


Eosinophils (%) (Auto)


  0.6 %


(0.0-3.0) 1.2 %


(0.0-3.0)


 


Basophils (%) (Auto)


  1.0 %


(0.0-2.0) 0.6 %


(0.0-2.0)


 


Sodium Level


  138 MMOL/L


(136-145) 139 MMOL/L


(136-145)


 


Potassium Level


  3.8 MMOL/L


(3.5-5.1) 3.8 MMOL/L


(3.5-5.1)


 


Chloride Level


  103 MMOL/L


() 104 MMOL/L


()


 


Carbon Dioxide Level


  29 MMOL/L


(21-32) 26 MMOL/L


(21-32)


 


Anion Gap


  6 mmol/L


(5-15) 9 mmol/L


(5-15)


 


Blood Urea Nitrogen


  14 mg/dL


(7-18) 17 mg/dL


(7-18)


 


Creatinine


  0.7 MG/DL


(0.55-1.30) 0.6 MG/DL


(0.55-1.30)


 


Estimat Glomerular Filtration


Rate > 60 mL/min


(>60) > 60 mL/min


(>60)


 


Glucose Level


  166 MG/DL


()  H 112 MG/DL


()  H


 


Calcium Level


  9.0 MG/DL


(8.5-10.1) 9.2 MG/DL


(8.5-10.1)


 


Total Bilirubin


  0.5 MG/DL


(0.2-1.0) 0.5 MG/DL


(0.2-1.0)


 


Aspartate Amino Transf


(AST/SGOT) 33 U/L (15-37)


  28 U/L (15-37)


 


 


Alanine Aminotransferase


(ALT/SGPT) 21 U/L (12-78)


  42 U/L (12-78)


 


 


Alkaline Phosphatase


  63 U/L


() 62 U/L


()


 


Total Protein


  6.9 G/DL


(6.4-8.2) 7.1 G/DL


(6.4-8.2)


 


Albumin


  3.2 G/DL


(3.4-5.0)  L 3.3 G/DL


(3.4-5.0)  L


 


Globulin 3.7 g/dL   3.8 g/dL  


 


Albumin/Globulin Ratio


  0.9 (1.0-2.7)


L 0.9 (1.0-2.7)


L


 


Salicylates Level


  2.4 ug/mL


(2.8-20)  L 


 


 


Urine Opiates Screen


  Negative


(NEGATIVE) 


 


 


Acetaminophen Level


  < 2 MCG/ML


(10-30)  L 


 


 


Urine Barbiturates Screen


  Negative


(NEGATIVE) 


 


 


Phencyclidine (PCP) Screen


  Negative


(NEGATIVE) 


 


 


Urine Amphetamines Screen


  Negative


(NEGATIVE) 


 


 


Urine Benzodiazepines Screen


  Negative


(NEGATIVE) 


 


 


Urine Cocaine Screen


  Negative


(NEGATIVE) 


 


 


Urine Marijuana (THC) Screen


  Negative


(NEGATIVE) 


 


 


Serum Alcohol < 3 mg/dL   


 


Triglycerides Level


  


  41 MG/DL


()


 


Cholesterol Level


  


  109 MG/DL (<


200)


 


LDL Cholesterol


  


  65 mg/dL


(<100)


 


HDL Cholesterol


  


  41 MG/DL


(40-60)


 


Cholesterol/HDL Ratio


  


  2.7 (3.3-4.4)


L


 


Thyroid Stimulating Hormone


(TSH) 


  1.066 uiU/mL


(0.358-3.740)








Height (Feet):  5


Height (Inches):  10.00


Weight (Pounds):  165


Medications





Current Medications








 Medications


  (Trade)  Dose


 Ordered  Sig/Juno


 Route


 PRN Reason  Start Time


 Stop Time Status Last Admin


Dose Admin


 


 Acetaminophen


  (Tylenol)  650 mg  Q4H  PRN


 ORAL


 fever  9/1/18 21:45


 10/1/18 21:44   


 


 


 Al Hydroxide/Mg


 Hydroxide


  (Mylanta II)  30 ml  Q6H  PRN


 ORAL


 dyspepsia  9/1/18 21:45


 10/1/18 21:44   


 


 


 Carbidopa/Levodopa


  (Sinemet 25/100)  1 tab  THREE TIMES A  DAY


 ORAL


   9/2/18 09:00


 10/2/18 08:59  9/2/18 10:07


 


 


 Carvedilol


  (Coreg)  6.25 mg  EVERY 12  HOURS


 ORAL


   9/2/18 09:00


 10/2/18 08:59  9/2/18 10:08


 


 


 Clonazepam


  (KlonoPIN)  0.5 mg  BID


 ORAL


   9/2/18 09:00


 9/9/18 08:59   


 


 


 Dextrose


  (Dextrose 50%)  25 ml  STAT  PRN


 IV


 Hypoglycemia  9/1/18 21:45


 10/1/18 21:44   


 


 


 Dextrose


  (Dextrose 50%)  50 ml  STAT  PRN


 IV


 Hypoglycemia  9/1/18 21:45


 10/1/18 21:44   


 


 


 Entacapone


  (Comtan)  200 mg  DAILY


 ORAL


   9/2/18 09:00


 10/2/18 08:59  9/2/18 10:07


 


 


 Lorazepam


  (Ativan 2mg/ml


 1ml)  0.5 mg  Q4H  PRN


 IV


 For Anxiety  9/1/18 21:45


 9/8/18 21:44  9/2/18 08:40


 


 


 Morphine Sulfate


  (Morphine


 Sulfate)  1 mg  Q4H  PRN


 IVP


 For Pain  9/1/18 21:45


 9/8/18 21:44   


 


 


 Ondansetron HCl


  (Zofran)  4 mg  Q6H  PRN


 IVP


 Nausea & Vomiting  9/1/18 21:45


 10/1/18 21:44   


 


 


 Pantoprazole


  (Protonix)  40 mg  DAILY


 ORAL


   9/2/18 09:00


 10/2/18 08:59  9/2/18 10:07


 


 


 Polyethylene


 Glycol


  (Miralax)  17 gm  HSPRN  PRN


 ORAL


 Constipation  9/1/18 21:45


 10/1/18 21:44   


 


 


 Quetiapine


 Fumarate


  (SEROquel)  50 mg  DAILY


 ORAL


   9/2/18 09:00


 10/2/18 08:59  9/2/18 10:07


 


 


 Zolpidem Tartrate


  (Ambien)  5 mg  HSPRN  PRN


 ORAL


 Insomnia  9/1/18 21:45


 9/8/18 21:44   


 











Assessment/Plan


Assessment/Plan


ASSESSMENT


Schizoaffective disorder 


Parkinson disease  


Unable to care for himself 


Anemia 


Hypertension 


Probably protein calorie malnutrition 


Encephalopathy 2 to general medical condition 


Hyperglycemia, r/o DM 





PLAN OF CARE


MS floor


gentle IVF hydration


monitor renal parameters, lytes, correct lytes as needed, avoid nephrotoxic 


resume anti-Parkinson meds 


neuro eval 


BP management with BB 


PT /OT eval, fall precautions 


check HgA1c 


Dietary eval  





Monitor HH with goal to keep Hgb above 7


Psych eval done on previous admission, follow  with  further recommendations  


SS eval  for home safety 


Venous Duplex BLE   


DVT /GI prophylaxis 


monitor HH with goal to keep Hgb above 7 





case discussed and evaluated by supervising physician











Danica Lacey NP Sep 2, 2018 12:17

## 2018-09-02 NOTE — HISTORY & PHYSICAL
History and Physical


History & Physicial


Dictated for Int Med-Dr Real no. [id number].











Pritesh Samuels MD Sep 2, 2018 16:28

## 2018-09-03 NOTE — PULMONOLOGY PROGRESS NOTE
Assessment/Plan


Assessment/Plan


ASSESSMENT


Schizoaffective disorder 


Parkinson disease  


Unable to care for himself 


Anemia 


Hypertension 


Probably protein calorie malnutrition 


Encephalopathy 2 to general medical condition 


Hyperglycemia, r/o DM 





PLAN OF CARE


MS floor


gentle IVF hydration


monitor renal parameters, lytes, correct lytes as needed, avoid nephrotoxic 


resume anti-Parkinson meds 


neuro eval- 


patient follows with  outpt neuro at [facility]   


BP management with BB 


PT /OT eval, 


fall precautions 


check HgA1c in am


Dietary eval  





Monitor HH with goal to keep Hgb above 7


Psych eval done on previous admission, follow  with  further recommendations  


SS eval  for home safety 


Venous Duplex BLE   


DVT /GI prophylaxis 


monitor HH with goal to keep Hgb above 7 





case discussed and evaluated by supervising physician





Subjective


Allergies:  


Coded Allergies:  


     NO KNOWN ALLERGIES (Verified  Allergy, Unknown, 9/1/18)


Subjective


labs  stable  no CP no SOB, 


more responsive , awake, alert





Objective





Last 24 Hour Vital Signs








  Date Time  Temp Pulse Resp B/P (MAP) Pulse Ox O2 Delivery O2 Flow Rate FiO2


 


9/3/18 04:00 97.3 83 18 124/72 (89) 99   





 97.3       


 


9/2/18 23:45 97.9 84 20 102/62 (75) 99   





 97.9       


 


9/2/18 21:00      Room Air  


 


9/2/18 20:46  90  112/62    


 


9/2/18 20:00 98.4 90 19 112/62 (79) 98   





 98.4       


 


9/2/18 16:00 98.2 60 20 111/77 (88) 97   





 98.2       


 


9/2/18 12:00 98.1 66 20 108/67 (81) 97   





 98.1       


 


9/2/18 10:08  79  131/85    


 


9/2/18 09:00      Room Air  


 


9/2/18 08:00 99.3 79 21 131/85 (100) 97   





 99.3       

















Intake and Output  


 


 9/2/18 9/3/18





 19:00 07:00


 


Intake Total 480 ml 320 ml


 


Balance 480 ml 320 ml


 


  


 


Intake Oral 480 ml 320 ml


 


# Voids 3 3








Objective


General Appearance:  alert,  awake, alert, responsive, dysarthric


Lines, tubes and drains:  peripheral


HEENT:  normocephalic, atraumatic, anicteric, mucous membranes moist, PERRL


Neck:  non-tender, normal alignment


Respiratory/Chest:  chest wall non-tender, lungs clear, no respiratory distress

, no accessory muscle use


Cardiovascular/Chest:  normal rate, regular rhythm, no JVD


Abdomen:  normal bowel sounds, non tender, soft


Extremities:  non-tender, no calf tenderness, normal capillary refill, other - 

fine tremors kathya hands


Skin Exam:  warm/dry


Neurologic:  abnormal gait, alert, responsive, other - stiffness





Current Medications








 Medications


  (Trade)  Dose


 Ordered  Sig/Juno


 Route


 PRN Reason  Start Time


 Stop Time Status Last Admin


Dose Admin


 


 Acetaminophen


  (Tylenol)  650 mg  Q4H  PRN


 ORAL


 fever  9/1/18 21:45


 10/1/18 21:44   


 


 


 Al Hydroxide/Mg


 Hydroxide


  (Mylanta II)  30 ml  Q6H  PRN


 ORAL


 dyspepsia  9/1/18 21:45


 10/1/18 21:44   


 


 


 Carbidopa/Levodopa


  (Sinemet 10/100)  1 tab  FIVE TIMES A  DAY


 ORAL


   9/2/18 16:00


 10/2/18 15:59  9/3/18 06:51


 


 


 Carvedilol


  (Coreg)  6.25 mg  EVERY 12  HOURS


 ORAL


   9/2/18 09:00


 10/2/18 08:59  9/2/18 20:46


 


 


 Clonazepam


  (KlonoPIN)  0.5 mg  BID


 ORAL


   9/2/18 09:00


 9/9/18 08:59  9/2/18 17:51


 


 


 Dextrose


  (Dextrose 50%)  25 ml  STAT  PRN


 IV


 Hypoglycemia  9/1/18 21:45


 10/1/18 21:44   


 


 


 Dextrose


  (Dextrose 50%)  50 ml  STAT  PRN


 IV


 Hypoglycemia  9/1/18 21:45


 10/1/18 21:44   


 


 


 Entacapone


  (Comtan)  200 mg  DAILY


 ORAL


   9/2/18 09:00


 10/2/18 08:59  9/2/18 10:07


 


 


 Heparin Sodium


  (Porcine)


  (Heparin 5000


 units/ml)  5,000 units  EVERY 12  HOURS


 SUBQ


   9/2/18 21:00


 10/2/18 20:59  9/2/18 20:50


 


 


 Lorazepam


  (Ativan 2mg/ml


 1ml)  0.5 mg  Q4H  PRN


 IV


 For Anxiety  9/1/18 21:45


 9/8/18 21:44  9/3/18 02:47


 


 


 Methocarbamol


  (Robaxin)  750 mg  TID  PRN


 ORAL


 Muscle Spasm  9/2/18 23:45


 10/2/18 23:44  9/3/18 02:30


 


 


 Morphine Sulfate


  (Morphine


 Sulfate)  1 mg  Q4H  PRN


 IVP


 For Pain  9/1/18 21:45


 9/8/18 21:44   


 


 


 Ondansetron HCl


  (Zofran)  4 mg  Q6H  PRN


 IVP


 Nausea & Vomiting  9/1/18 21:45


 10/1/18 21:44   


 


 


 Pantoprazole


  (Protonix)  40 mg  DAILY


 ORAL


   9/2/18 09:00


 10/2/18 08:59  9/2/18 10:07


 


 


 Polyethylene


 Glycol


  (Miralax)  17 gm  HSPRN  PRN


 ORAL


 Constipation  9/1/18 21:45


 10/1/18 21:44   


 


 


 Quetiapine


 Fumarate


  (SEROquel)  50 mg  DAILY


 ORAL


   9/2/18 09:00


 10/2/18 08:59  9/2/18 10:07


 


 


 Zolpidem Tartrate


  (Ambien)  5 mg  HSPRN  PRN


 ORAL


 Insomnia  9/1/18 21:45


 9/8/18 21:44  9/3/18 02:31


 

















Danica Lacey NP Sep 3, 2018 08:01

## 2018-09-03 NOTE — INTERNAL MED PROGRESS NOTE
Subjective


Date of Service:  Sep 3, 2018


Physician Name


Pritesh Samuels


Attending Physician


Salvador Real MD





Current Medications








 Medications


  (Trade)  Dose


 Ordered  Sig/Juno


 Route


 PRN Reason  Start Time


 Stop Time Status Last Admin


Dose Admin


 


 Acetaminophen


  (Tylenol)  650 mg  Q4H  PRN


 ORAL


 fever  9/1/18 21:45


 10/1/18 21:44   


 


 


 Al Hydroxide/Mg


 Hydroxide


  (Mylanta II)  30 ml  Q6H  PRN


 ORAL


 dyspepsia  9/1/18 21:45


 10/1/18 21:44  9/3/18 12:51


 


 


 Carbidopa/Levodopa


  (Sinemet 10/100)  1 tab  FIVE TIMES A  DAY


 ORAL


   9/2/18 16:00


 10/2/18 15:59  9/3/18 09:03


 


 


 Carvedilol


  (Coreg)  6.25 mg  EVERY 12  HOURS


 ORAL


   9/2/18 09:00


 10/2/18 08:59  9/2/18 20:46


 


 


 Clonazepam


  (KlonoPIN)  0.5 mg  BID


 ORAL


   9/2/18 09:00


 9/9/18 08:59  9/2/18 17:51


 


 


 Dextrose


  (Dextrose 50%)  25 ml  STAT  PRN


 IV


 Hypoglycemia  9/1/18 21:45


 10/1/18 21:44   


 


 


 Dextrose


  (Dextrose 50%)  50 ml  STAT  PRN


 IV


 Hypoglycemia  9/1/18 21:45


 10/1/18 21:44   


 


 


 Entacapone


  (Comtan)  200 mg  DAILY


 ORAL


   9/2/18 09:00


 10/2/18 08:59  9/3/18 08:51


 


 


 Heparin Sodium


  (Porcine)


  (Heparin 5000


 units/ml)  5,000 units  EVERY 12  HOURS


 SUBQ


   9/2/18 21:00


 10/2/18 20:59  9/3/18 09:00


 


 


 Lorazepam


  (Ativan 2mg/ml


 1ml)  0.5 mg  Q4H  PRN


 IV


 For Anxiety  9/1/18 21:45


 9/8/18 21:44  9/3/18 14:44


 


 


 Methocarbamol


  (Robaxin)  750 mg  TID  PRN


 ORAL


 Muscle Spasm  9/2/18 23:45


 10/2/18 23:44  9/3/18 09:04


 


 


 Morphine Sulfate


  (Morphine


 Sulfate)  1 mg  Q4H  PRN


 IVP


 For Pain  9/1/18 21:45


 9/8/18 21:44  9/3/18 14:15


 


 


 Ondansetron HCl


  (Zofran)  4 mg  Q6H  PRN


 IVP


 Nausea & Vomiting  9/1/18 21:45


 10/1/18 21:44   


 


 


 Pantoprazole


  (Protonix)  40 mg  DAILY


 ORAL


   9/2/18 09:00


 10/2/18 08:59  9/3/18 08:51


 


 


 Polyethylene


 Glycol


  (Miralax)  17 gm  HSPRN  PRN


 ORAL


 Constipation  9/1/18 21:45


 10/1/18 21:44   


 


 


 Quetiapine


 Fumarate


  (SEROquel)  50 mg  DAILY


 ORAL


   9/2/18 09:00


 10/2/18 08:59  9/2/18 10:07


 


 


 Zolpidem Tartrate


  (Ambien)  5 mg  HSPRN  PRN


 ORAL


 Insomnia  9/1/18 21:45


 9/8/18 21:44  9/3/18 02:31


 








Allergies:  


Coded Allergies:  


     NO KNOWN ALLERGIES (Verified  Allergy, Unknown, 9/1/18)


ROS Limited/Unobtainable:  Yes


Subjective


56 YO M with schizoaffective D/O admitted with panic attack.  Cover tor Int Med 

Dr Kirkpatrick





Objective





Last Vital Signs








  Date Time  Temp Pulse Resp B/P (MAP) Pulse Ox O2 Delivery O2 Flow Rate FiO2


 


9/3/18 15:49 97.2 71 18 154/60 (91) 96   





 97.2       


 


9/3/18 09:00      Room Air  








General Appearance:  WD/WN, no apparent distress, alert


EENT:  PERRL/EOMI, normal ENT inspection


Neck:  non-tender, normal alignment, supple, normal inspection


Cardiovascular:  normal peripheral pulses, normal rate, regular rhythm, no 

gallop/murmur, no JVD


Respiratory/Chest:  chest wall non-tender, lungs clear, normal breath sounds, 

no respiratory distress, no accessory muscle use


Abdomen:  normal bowel sounds, non tender, soft, no organomegaly, no mass


Extremities:  normal range of motion, non-tender


Neurologic:  CNs II-XII grossly normal, no motor/sensory deficits


Skin:  normal pigmentation, warm/dry





Laboratory Tests








Test


  9/3/18


07:15 9/3/18


14:50


 


White Blood Count


  4.7 K/UL


(4.8-10.8)  L 


 


 


Red Blood Count


  4.25 M/UL


(4.70-6.10)  L 


 


 


Hemoglobin


  12.5 G/DL


(14.2-18.0)  L 


 


 


Hematocrit


  37.5 %


(42.0-52.0)  L 


 


 


Mean Corpuscular Volume 88 FL (80-99)   


 


Mean Corpuscular Hemoglobin


  29.4 PG


(27.0-31.0) 


 


 


Mean Corpuscular Hemoglobin


Concent 33.4 G/DL


(32.0-36.0) 


 


 


Red Cell Distribution Width


  11.4 %


(11.6-14.8)  L 


 


 


Platelet Count


  307 K/UL


(150-450) 


 


 


Mean Platelet Volume


  5.4 FL


(6.5-10.1)  L 


 


 


Neutrophils (%) (Auto)


  53.7 %


(45.0-75.0) 


 


 


Lymphocytes (%) (Auto)


  31.4 %


(20.0-45.0) 


 


 


Monocytes (%) (Auto)


  11.4 %


(1.0-10.0)  H 


 


 


Eosinophils (%) (Auto)


  2.7 %


(0.0-3.0) 


 


 


Basophils (%) (Auto)


  0.8 %


(0.0-2.0) 


 


 


Sodium Level


  138 MMOL/L


(136-145) 


 


 


Potassium Level


  3.9 MMOL/L


(3.5-5.1) 


 


 


Chloride Level


  104 MMOL/L


() 


 


 


Carbon Dioxide Level


  25 MMOL/L


(21-32) 


 


 


Anion Gap


  9 mmol/L


(5-15) 


 


 


Blood Urea Nitrogen


  13 mg/dL


(7-18) 


 


 


Creatinine


  0.7 MG/DL


(0.55-1.30) 


 


 


Estimat Glomerular Filtration


Rate > 60 mL/min


(>60) 


 


 


Glucose Level


  132 MG/DL


()  H 


 


 


Calcium Level


  9.4 MG/DL


(8.5-10.1) 


 


 


Creatine Kinase MB


  


  0.6 NG/ML


(0.0-3.6)


 


Troponin I


  


  0.002 ng/mL


(0.000-0.056)

















Intake and Output  


 


 9/2/18 9/3/18





 19:00 07:00


 


Intake Total 480 ml 320 ml


 


Balance 480 ml 320 ml


 


  


 


Intake Oral 480 ml 320 ml


 


# Voids 3 3











Assessment/Plan


Problem List:  


(1) HTN (hypertension)


Assessment & Plan:  Continue coreg





(2) Panic disorder


(3) Parkinson disease


Assessment & Plan:  Continue sinemet





(4) Schizoaffective disorder


Assessment & Plan:  see psych note.  Continue seroquel and entacapone.





Status:  unchanged











Samuels,Pritesh MD Sep 3, 2018 16:24

## 2018-09-04 NOTE — GENERAL PROGRESS NOTE
Assessment/Plan


Status:  stable, progressing


Assessment/Plan


panic attack


mdd





paxil 20mg qhs


ativan prn


seroquel 25mg qam





Subjective


Date patient seen:  Sep 4, 2018


Neurologic/Psychiatric:  Reports: anxiety, depressed, emotional problems


Allergies:  


Coded Allergies:  


     NO KNOWN ALLERGIES (Verified  Allergy, Unknown, 9/1/18)





Objective





Last 24 Hour Vital Signs








  Date Time  Temp Pulse Resp B/P (MAP) Pulse Ox O2 Delivery O2 Flow Rate FiO2


 


9/4/18 12:00 98.2 84 16 107/58 (74) 98   





 98.2       


 


9/4/18 09:00  82  103/67    


 


9/4/18 09:00      Room Air  


 


9/4/18 08:00 97.8 82 20 103/67 (79) 98   





 97.8       


 


9/4/18 03:53 98.5 80 19 100/61 (74) 98   





 98.5       


 


9/3/18 23:34 98.2 72 18 103/57 (72) 99   





 98.2       


 


9/3/18 22:35    105/59 (74)    


 


9/3/18 21:00      Room Air  


 


9/3/18 20:23  76  97/59    


 


9/3/18 20:00 98.4 76 19 97/59 (72) 99   





 98.4       


 


9/3/18 16:50  85  103/65 (78)    


 


9/3/18 15:49 97.2 71 18 154/60 (91) 96   





 97.2       

















Intake and Output  


 


 9/3/18 9/4/18





 19:00 07:00


 


Intake Total 240 ml 1005 ml


 


Balance 240 ml 1005 ml


 


  


 


Intake Oral 240 ml 480 ml


 


IV Total  525 ml


 


# Voids 3 3


 


# Bowel Movements 1 1








Laboratory Tests


9/4/18 05:45: 


White Blood Count 5.6, Red Blood Count 3.68L, Hemoglobin 10.7L, Hematocrit 32.3L

, Mean Corpuscular Volume 88, Mean Corpuscular Hemoglobin 29.0, Mean 

Corpuscular Hemoglobin Concent 33.0, Red Cell Distribution Width 11.5L, 

Platelet Count 288, Mean Platelet Volume 5.1L, Neutrophils (%) (Auto) 58.0, 

Lymphocytes (%) (Auto) 30.4, Monocytes (%) (Auto) 8.7, Eosinophils (%) (Auto) 

2.3, Basophils (%) (Auto) 0.7, Sodium Level 140, Potassium Level 3.8, Chloride 

Level 106, Carbon Dioxide Level 28, Anion Gap 6, Blood Urea Nitrogen 16, 

Creatinine 0.6, Estimat Glomerular Filtration Rate > 60, Glucose Level 153H, 

Hemoglobin A1c 5.6, Calcium Level 8.6


Height (Feet):  5


Height (Inches):  10.00


Weight (Pounds):  165


General Appearance:  no apparent distress, alert


Neurologic:  oriented x 3, responsive, depressed affect











Kristian Lima MD Sep 4, 2018 15:12

## 2018-09-04 NOTE — CONSULTATION
History of Present Illness


General


Date patient seen:  Aug 31, 2018


Chief Complaint:  General Complaint


Referring physician:  dr Real


Reason for Consultation:  inpatient management





Present Illness


HPI


this is a late entry the pt was seen on Friday. 


57-year-old white male who presents with chief complaint of panic attack. the 

pt was anxious paranoid and depressed. the pt was able to provide hx


Allergies:  


Coded Allergies:  


     NO KNOWN ALLERGIES (Verified  Allergy, Unknown, 9/1/18)





Medication History


Scheduled


Albuterol Sulfate* (Proair Hfa*), 1 PUFF INH THREE TIMES A DAY, (Reported)


Carbidopa/Levodopa  Mg* (Sinemet  Mg Tablet*), 1 TAB ORAL THREE 

TIMES A DAY, (Reported)


Carvedilol* (Carvedilol*), 6.25 MG ORAL EVERY 12 HOURS, (Reported)


Clonazepam* (Klonopin*), 0.5 MG ORAL BID, (Reported)


Docusate Sodium* (Docusate Sodium*), 100 MG ORAL TWICE A DAY, (Reported)


Entacapone (Entacapone), 200 MG PO DAILY, (Reported)


Fluticasone Propionate (Fluticasone Propionate), 1 SPRAY NASAL DAILY, (Reported)


Fluticasone Propionate* (Fluticasone Propionate*), 1 SPRAY NASAL DAILY, (

Reported)


Lorazepam* (Ativan*), 2 MG ORAL DAILY, (Reported)


Pantoprazole* (Pantoprazole*), 40 MG ORAL DAILY, (Reported)


Paroxetine Hcl (Paxil), 20 MG PO HS, (Reported)


Quetiapine Fumarate* (Quetiapine Fumarate*), 50 MG ORAL DAILY, (Reported)





Scheduled PRN


Hydrocodone Bit/Acetaminophen 5-325* (Norco 5-325*), 1 TAB ORAL Q6H PRN for For 

Pain, (Reported)


Methocarbamol (Methocarbamol), 750 MG PO TID PRN for Muscle Spasm, (Reported)


Methocarbamol* (Methocarbamol*), 750 MG ORAL THREE TIMES A DAY PRN for For Pain,

 (Reported)





Miscellaneous Medications


Paroxetine Hcl (Paxil), 10 MG PO, (Reported)


Unable to Obtain Medications (Unable To Obtain Meds), (Reported)





Patient History


Healthcare decision maker





Resuscitation status


Full Code


Advanced Directive on File


No





Physical Exam





Last 24 Hour Vital Signs








  Date Time  Temp Pulse Resp B/P (MAP) Pulse Ox O2 Delivery O2 Flow Rate FiO2


 


9/4/18 09:00  82  103/67    


 


9/4/18 09:00      Room Air  


 


9/4/18 08:00 97.8 82 20 103/67 (79) 98   





 97.8       


 


9/4/18 03:53 98.5 80 19 100/61 (74) 98   





 98.5       


 


9/3/18 23:34 98.2 72 18 103/57 (72) 99   





 98.2       


 


9/3/18 22:35    105/59 (74)    


 


9/3/18 21:00      Room Air  


 


9/3/18 20:23  76  97/59    


 


9/3/18 20:00 98.4 76 19 97/59 (72) 99   





 98.4       


 


9/3/18 16:50  85  103/65 (78)    


 


9/3/18 15:49 97.2 71 18 154/60 (91) 96   





 97.2       


 


9/3/18 14:45 97.2       


 


9/3/18 14:15 98.1       

















Intake and Output  


 


 9/3/18 9/4/18





 19:00 07:00


 


Intake Total 240 ml 980 ml


 


Balance 240 ml 980 ml


 


  


 


Intake Oral 240 ml 480 ml


 


IV Total  500 ml


 


# Voids 3 3


 


# Bowel Movements 1 1











Laboratory Tests








Test


  9/3/18


14:50 9/4/18


05:45


 


Creatine Kinase MB


  0.6 NG/ML


(0.0-3.6) 


 


 


Troponin I


  0.002 ng/mL


(0.000-0.056) 


 


 


White Blood Count


  


  5.6 K/UL


(4.8-10.8)


 


Red Blood Count


  


  3.68 M/UL


(4.70-6.10)  L


 


Hemoglobin


  


  10.7 G/DL


(14.2-18.0)  L


 


Hematocrit


  


  32.3 %


(42.0-52.0)  L


 


Mean Corpuscular Volume  88 FL (80-99)  


 


Mean Corpuscular Hemoglobin


  


  29.0 PG


(27.0-31.0)


 


Mean Corpuscular Hemoglobin


Concent 


  33.0 G/DL


(32.0-36.0)


 


Red Cell Distribution Width


  


  11.5 %


(11.6-14.8)  L


 


Platelet Count


  


  288 K/UL


(150-450)


 


Mean Platelet Volume


  


  5.1 FL


(6.5-10.1)  L


 


Neutrophils (%) (Auto)


  


  58.0 %


(45.0-75.0)


 


Lymphocytes (%) (Auto)


  


  30.4 %


(20.0-45.0)


 


Monocytes (%) (Auto)


  


  8.7 %


(1.0-10.0)


 


Eosinophils (%) (Auto)


  


  2.3 %


(0.0-3.0)


 


Basophils (%) (Auto)


  


  0.7 %


(0.0-2.0)


 


Sodium Level


  


  140 MMOL/L


(136-145)


 


Potassium Level


  


  3.8 MMOL/L


(3.5-5.1)


 


Chloride Level


  


  106 MMOL/L


()


 


Carbon Dioxide Level


  


  28 MMOL/L


(21-32)


 


Anion Gap


  


  6 mmol/L


(5-15)


 


Blood Urea Nitrogen


  


  16 mg/dL


(7-18)


 


Creatinine


  


  0.6 MG/DL


(0.55-1.30)


 


Estimat Glomerular Filtration


Rate 


  > 60 mL/min


(>60)


 


Glucose Level


  


  153 MG/DL


()  H


 


Hemoglobin A1c


  


  5.6 %


(4.3-6.0)


 


Calcium Level


  


  8.6 MG/DL


(8.5-10.1)








Height (Feet):  5


Height (Inches):  10.00


Weight (Pounds):  165


Medications





Current Medications








 Medications


  (Trade)  Dose


 Ordered  Sig/Juno


 Route


 PRN Reason  Start Time


 Stop Time Status Last Admin


Dose Admin


 


 Acetaminophen


  (Tylenol)  650 mg  Q4H  PRN


 ORAL


 fever  9/1/18 21:45


 10/1/18 21:44   


 


 


 Al Hydroxide/Mg


 Hydroxide


  (Mylanta II)  30 ml  Q6H  PRN


 ORAL


 dyspepsia  9/1/18 21:45


 10/1/18 21:44  9/3/18 19:03


 


 


 Carbidopa/Levodopa


  (Sinemet 10/100)  1 tab  FIVE TIMES A  DAY


 ORAL


   9/2/18 16:00


 10/2/18 15:59  9/4/18 09:17


 


 


 Carvedilol


  (Coreg)  6.25 mg  EVERY 12  HOURS


 ORAL


   9/2/18 09:00


 10/2/18 08:59  9/2/18 20:46


 


 


 Clonazepam


  (KlonoPIN)  0.5 mg  BID


 ORAL


   9/2/18 09:00


 9/9/18 08:59  9/2/18 17:51


 


 


 Dextrose


  (Dextrose 50%)  25 ml  STAT  PRN


 IV


 Hypoglycemia  9/1/18 21:45


 10/1/18 21:44   


 


 


 Dextrose


  (Dextrose 50%)  50 ml  STAT  PRN


 IV


 Hypoglycemia  9/1/18 21:45


 10/1/18 21:44   


 


 


 Entacapone


  (Comtan)  200 mg  DAILY


 ORAL


   9/2/18 09:00


 10/2/18 08:59  9/4/18 09:14


 


 


 Heparin Sodium


  (Porcine)


  (Heparin 5000


 units/ml)  5,000 units  EVERY 12  HOURS


 SUBQ


   9/2/18 21:00


 10/2/18 20:59  9/4/18 09:16


 


 


 Lorazepam


  (Ativan 2mg/ml


 1ml)  0.5 mg  Q4H  PRN


 IV


 For Anxiety  9/1/18 21:45


 9/8/18 21:44  9/4/18 11:06


 


 


 Methocarbamol


  (Robaxin)  750 mg  TID  PRN


 ORAL


 Muscle Spasm  9/2/18 23:45


 10/2/18 23:44  9/4/18 00:57


 


 


 Morphine Sulfate


  (Morphine


 Sulfate)  1 mg  Q4H  PRN


 IVP


 For Pain  9/1/18 21:45


 9/8/18 21:44  9/3/18 14:15


 


 


 Ondansetron HCl


  (Zofran)  4 mg  Q6H  PRN


 IVP


 Nausea & Vomiting  9/1/18 21:45


 10/1/18 21:44   


 


 


 Pantoprazole


  (Protonix)  40 mg  DAILY


 ORAL


   9/2/18 09:00


 10/2/18 08:59  9/4/18 09:15


 


 


 Polyethylene


 Glycol


  (Miralax)  17 gm  HSPRN  PRN


 ORAL


 Constipation  9/1/18 21:45


 10/1/18 21:44   


 


 


 Quetiapine


 Fumarate


  (SEROquel)  50 mg  DAILY


 ORAL


   9/2/18 09:00


 10/2/18 08:59  9/2/18 10:07


 


 


 Sodium Chloride  1,000 ml @ 


 50 mls/hr  Q20H


 IV


   9/3/18 20:00


 10/3/18 19:59  9/3/18 20:18


 


 


 Zolpidem Tartrate


  (Ambien)  5 mg  HSPRN  PRN


 ORAL


 Insomnia  9/1/18 21:45


 9/8/18 21:44  9/3/18 02:31


 











Assessment/Plan


Assessment/Plan


panic attack


mdd





paxil 20mg qhs


ativan prn


seroquel 25mg Kristian Hussein MD Sep 4, 2018 12:58

## 2018-09-04 NOTE — PULMONOLOGY PROGRESS NOTE
Assessment/Plan


Problems:  


(1) Generalized weakness


(2) unable to care for himself 


(3) Encephalopathy


(4) Parkinson disease


(5) Schizoaffective disorder


(6) HTN (hypertension)


Assessment/Plan


psych evaluation


gi to see


symptomatic treatment


monitor BP





Subjective


ROS Limited/Unobtainable:  No


Constitutional:  Reports: no symptoms


HEENT:  Repors: no symptoms


Respiratory:  Reports: no symptoms


Allergies:  


Coded Allergies:  


     NO KNOWN ALLERGIES (Verified  Allergy, Unknown, 9/1/18)





Objective





Last 24 Hour Vital Signs








  Date Time  Temp Pulse Resp B/P (MAP) Pulse Ox O2 Delivery O2 Flow Rate FiO2


 


9/4/18 12:00 98.2 84 16 107/58 (74) 98   





 98.2       


 


9/4/18 09:00  82  103/67    


 


9/4/18 09:00      Room Air  


 


9/4/18 08:00 97.8 82 20 103/67 (79) 98   





 97.8       


 


9/4/18 03:53 98.5 80 19 100/61 (74) 98   





 98.5       


 


9/3/18 23:34 98.2 72 18 103/57 (72) 99   





 98.2       


 


9/3/18 22:35    105/59 (74)    


 


9/3/18 21:00      Room Air  


 


9/3/18 20:23  76  97/59    


 


9/3/18 20:00 98.4 76 19 97/59 (72) 99   





 98.4       


 


9/3/18 16:50  85  103/65 (78)    


 


9/3/18 15:49 97.2 71 18 154/60 (91) 96   





 97.2       


 


9/3/18 14:45 97.2       


 


9/3/18 14:15 98.1       

















Intake and Output  


 


 9/3/18 9/4/18





 19:00 07:00


 


Intake Total 240 ml 1005 ml


 


Balance 240 ml 1005 ml


 


  


 


Intake Oral 240 ml 480 ml


 


IV Total  525 ml


 


# Voids 3 3


 


# Bowel Movements 1 1








General Appearance:  WD/WN


HEENT:  normocephalic, atraumatic


Respiratory/Chest:  chest wall non-tender, lungs clear


Cardiovascular:  normal peripheral pulses, normal rate


Abdomen:  normal bowel sounds, soft, non tender


Genitourinary:  normal external genitalia


Extremities:  no cyanosis


Skin:  no rash


Neurologic/Psychiatric:  CNs II-XII grossly normal





Microbiology








 Date/Time


Source Procedure


Growth Status


 


 


 9/1/18 19:51


Nasal Nares MRSA Culture - Final


NO METHICILLIN RESISTANT STAPH AUREUS... Complete





 9/1/18 19:51


Rectum VRE Culture - Final


NO VANCOMYCIN RESISTANT ENTEROCOCCUS ... Complete


 


 9/1/18 19:51


Rectum - Final


NO CARBAPENEM-RESISTANT ENTEROBACTERI... Complete








Laboratory Tests


9/3/18 14:50: 


Creatine Kinase MB 0.6, Troponin I 0.002


9/4/18 05:45: 


White Blood Count 5.6, Red Blood Count 3.68L, Hemoglobin 10.7L, Hematocrit 32.3L

, Mean Corpuscular Volume 88, Mean Corpuscular Hemoglobin 29.0, Mean 

Corpuscular Hemoglobin Concent 33.0, Red Cell Distribution Width 11.5L, 

Platelet Count 288, Mean Platelet Volume 5.1L, Neutrophils (%) (Auto) 58.0, 

Lymphocytes (%) (Auto) 30.4, Monocytes (%) (Auto) 8.7, Eosinophils (%) (Auto) 

2.3, Basophils (%) (Auto) 0.7, Sodium Level 140, Potassium Level 3.8, Chloride 

Level 106, Carbon Dioxide Level 28, Anion Gap 6, Blood Urea Nitrogen 16, 

Creatinine 0.6, Estimat Glomerular Filtration Rate > 60, Glucose Level 153H, 

Hemoglobin A1c 5.6, Calcium Level 8.6





Current Medications








 Medications


  (Trade)  Dose


 Ordered  Sig/Juno


 Route


 PRN Reason  Start Time


 Stop Time Status Last Admin


Dose Admin


 


 Acetaminophen


  (Tylenol)  650 mg  Q4H  PRN


 ORAL


 fever  9/1/18 21:45


 10/1/18 21:44   


 


 


 Al Hydroxide/Mg


 Hydroxide


  (Mylanta II)  30 ml  Q6H  PRN


 ORAL


 dyspepsia  9/1/18 21:45


 10/1/18 21:44  9/3/18 19:03


 


 


 Carbidopa/Levodopa


  (Sinemet 10/100)  1 tab  FIVE TIMES A  DAY


 ORAL


   9/2/18 16:00


 10/2/18 15:59  9/4/18 13:26


 


 


 Carvedilol


  (Coreg)  6.25 mg  EVERY 12  HOURS


 ORAL


   9/2/18 09:00


 10/2/18 08:59  9/2/18 20:46


 


 


 Clonazepam


  (KlonoPIN)  0.5 mg  BID


 ORAL


   9/2/18 09:00


 9/9/18 08:59  9/2/18 17:51


 


 


 Dextrose


  (Dextrose 50%)  25 ml  STAT  PRN


 IV


 Hypoglycemia  9/1/18 21:45


 10/1/18 21:44   


 


 


 Dextrose


  (Dextrose 50%)  50 ml  STAT  PRN


 IV


 Hypoglycemia  9/1/18 21:45


 10/1/18 21:44   


 


 


 Entacapone


  (Comtan)  200 mg  DAILY


 ORAL


   9/2/18 09:00


 10/2/18 08:59  9/4/18 09:14


 


 


 Heparin Sodium


  (Porcine)


  (Heparin 5000


 units/ml)  5,000 units  EVERY 12  HOURS


 SUBQ


   9/2/18 21:00


 10/2/18 20:59  9/4/18 09:16


 


 


 Lorazepam


  (Ativan 2mg/ml


 1ml)  0.5 mg  Q4H  PRN


 IV


 For Anxiety  9/1/18 21:45


 9/8/18 21:44  9/4/18 11:06


 


 


 Methocarbamol


  (Robaxin)  750 mg  TID  PRN


 ORAL


 Muscle Spasm  9/2/18 23:45


 10/2/18 23:44  9/4/18 00:57


 


 


 Morphine Sulfate


  (Morphine


 Sulfate)  1 mg  Q4H  PRN


 IVP


 For Pain  9/1/18 21:45


 9/8/18 21:44  9/3/18 14:15


 


 


 Ondansetron HCl


  (Zofran)  4 mg  Q6H  PRN


 IVP


 Nausea & Vomiting  9/1/18 21:45


 10/1/18 21:44   


 


 


 Pantoprazole


  (Protonix)  40 mg  DAILY


 ORAL


   9/2/18 09:00


 10/2/18 08:59  9/4/18 09:15


 


 


 Polyethylene


 Glycol


  (Miralax)  17 gm  HSPRN  PRN


 ORAL


 Constipation  9/1/18 21:45


 10/1/18 21:44   


 


 


 Quetiapine


 Fumarate


  (SEROquel)  50 mg  DAILY


 ORAL


   9/2/18 09:00


 10/2/18 08:59  9/2/18 10:07


 


 


 Sodium Chloride  1,000 ml @ 


 50 mls/hr  Q20H


 IV


   9/3/18 20:00


 10/3/18 19:59  9/3/18 20:18


 


 


 Zolpidem Tartrate


  (Ambien)  5 mg  HSPRN  PRN


 ORAL


 Insomnia  9/1/18 21:45


 9/8/18 21:44  9/3/18 02:31


 

















Bala Wisdom MD Sep 4, 2018 13:51

## 2018-09-04 NOTE — INTERNAL MED PROGRESS NOTE
Subjective


Date of Service:  Sep 4, 2018


Physician Name


Pritesh Samuels


Attending Physician


Salvador Real MD





Current Medications








 Medications


  (Trade)  Dose


 Ordered  Sig/Juno


 Route


 PRN Reason  Start Time


 Stop Time Status Last Admin


Dose Admin


 


 Acetaminophen


  (Tylenol)  650 mg  Q4H  PRN


 ORAL


 fever  9/1/18 21:45


 10/1/18 21:44   


 


 


 Al Hydroxide/Mg


 Hydroxide


  (Mylanta II)  30 ml  Q6H  PRN


 ORAL


 dyspepsia  9/1/18 21:45


 10/1/18 21:44  9/4/18 17:46


 


 


 Carbidopa/Levodopa


  (Sinemet 10/100)  1 tab  FIVE TIMES A  DAY


 ORAL


   9/2/18 16:00


 10/2/18 15:59  9/4/18 15:30


 


 


 Carvedilol


  (Coreg)  6.25 mg  EVERY 12  HOURS


 ORAL


   9/2/18 09:00


 10/2/18 08:59  9/2/18 20:46


 


 


 Clonazepam


  (KlonoPIN)  0.5 mg  BID


 ORAL


   9/2/18 09:00


 9/9/18 08:59  9/4/18 17:30


 


 


 Dextrose


  (Dextrose 50%)  25 ml  STAT  PRN


 IV


 Hypoglycemia  9/1/18 21:45


 10/1/18 21:44   


 


 


 Dextrose


  (Dextrose 50%)  50 ml  STAT  PRN


 IV


 Hypoglycemia  9/1/18 21:45


 10/1/18 21:44   


 


 


 Entacapone


  (Comtan)  200 mg  DAILY


 ORAL


   9/2/18 09:00


 10/2/18 08:59  9/4/18 09:14


 


 


 Heparin Sodium


  (Porcine)


  (Heparin 5000


 units/ml)  5,000 units  EVERY 12  HOURS


 SUBQ


   9/2/18 21:00


 10/2/18 20:59  9/4/18 09:16


 


 


 Lorazepam


  (Ativan 2mg/ml


 1ml)  0.5 mg  Q4H  PRN


 IV


 For Anxiety  9/1/18 21:45


 9/8/18 21:44  9/4/18 15:43


 


 


 Methocarbamol


  (Robaxin)  750 mg  TID  PRN


 ORAL


 Muscle Spasm  9/2/18 23:45


 10/2/18 23:44  9/4/18 17:31


 


 


 Morphine Sulfate


  (Morphine


 Sulfate)  1 mg  Q4H  PRN


 IVP


 For Pain  9/1/18 21:45


 9/8/18 21:44  9/3/18 14:15


 


 


 Ondansetron HCl


  (Zofran)  4 mg  Q6H  PRN


 IVP


 Nausea & Vomiting  9/1/18 21:45


 10/1/18 21:44   


 


 


 Pantoprazole


  (Protonix)  40 mg  DAILY


 ORAL


   9/2/18 09:00


 10/2/18 08:59  9/4/18 09:15


 


 


 Polyethylene


 Glycol


  (Miralax)  17 gm  HSPRN  PRN


 ORAL


 Constipation  9/1/18 21:45


 10/1/18 21:44   


 


 


 Quetiapine


 Fumarate


  (SEROquel)  50 mg  DAILY


 ORAL


   9/2/18 09:00


 10/2/18 08:59  9/2/18 10:07


 


 


 Sodium Chloride  1,000 ml @ 


 50 mls/hr  Q20H


 IV


   9/3/18 20:00


 10/3/18 19:59  9/4/18 15:30


 


 


 Zolpidem Tartrate


  (Ambien)  5 mg  HSPRN  PRN


 ORAL


 Insomnia  9/1/18 21:45


 9/8/18 21:44  9/3/18 02:31


 








Allergies:  


Coded Allergies:  


     NO KNOWN ALLERGIES (Verified  Allergy, Unknown, 9/1/18)


ROS Limited/Unobtainable:  Yes


Subjective


58 YO M with schizoaffective D/O admitted with panic attack.  Cover tor Int Med 

Dr Kirkpatrick





Objective





Last Vital Signs








  Date Time  Temp Pulse Resp B/P (MAP) Pulse Ox O2 Delivery O2 Flow Rate FiO2


 


9/4/18 16:00 98.1 89 16 118/67 (84) 98   





 98.1       


 


9/4/18 09:00      Room Air  











Laboratory Tests








Test


  9/4/18


05:45


 


White Blood Count


  5.6 K/UL


(4.8-10.8)


 


Red Blood Count


  3.68 M/UL


(4.70-6.10)  L


 


Hemoglobin


  10.7 G/DL


(14.2-18.0)  L


 


Hematocrit


  32.3 %


(42.0-52.0)  L


 


Mean Corpuscular Volume 88 FL (80-99)  


 


Mean Corpuscular Hemoglobin


  29.0 PG


(27.0-31.0)


 


Mean Corpuscular Hemoglobin


Concent 33.0 G/DL


(32.0-36.0)


 


Red Cell Distribution Width


  11.5 %


(11.6-14.8)  L


 


Platelet Count


  288 K/UL


(150-450)


 


Mean Platelet Volume


  5.1 FL


(6.5-10.1)  L


 


Neutrophils (%) (Auto)


  58.0 %


(45.0-75.0)


 


Lymphocytes (%) (Auto)


  30.4 %


(20.0-45.0)


 


Monocytes (%) (Auto)


  8.7 %


(1.0-10.0)


 


Eosinophils (%) (Auto)


  2.3 %


(0.0-3.0)


 


Basophils (%) (Auto)


  0.7 %


(0.0-2.0)


 


Sodium Level


  140 MMOL/L


(136-145)


 


Potassium Level


  3.8 MMOL/L


(3.5-5.1)


 


Chloride Level


  106 MMOL/L


()


 


Carbon Dioxide Level


  28 MMOL/L


(21-32)


 


Anion Gap


  6 mmol/L


(5-15)


 


Blood Urea Nitrogen


  16 mg/dL


(7-18)


 


Creatinine


  0.6 MG/DL


(0.55-1.30)


 


Estimat Glomerular Filtration


Rate > 60 mL/min


(>60)


 


Glucose Level


  153 MG/DL


()  H


 


Hemoglobin A1c


  5.6 %


(4.3-6.0)


 


Calcium Level


  8.6 MG/DL


(8.5-10.1)











Microbiology








 Date/Time


Source Procedure


Growth Status


 


 


 9/1/18 19:51


Nasal Nares MRSA Culture - Final


NO METHICILLIN RESISTANT STAPH AUREUS... Complete





 9/1/18 19:51


Rectum VRE Culture - Final


NO VANCOMYCIN RESISTANT ENTEROCOCCUS ... Complete


 


 9/1/18 19:51


Rectum - Final


NO CARBAPENEM-RESISTANT ENTEROBACTERI... Complete

















Intake and Output  


 


 9/3/18 9/4/18





 19:00 07:00


 


Intake Total 240 ml 1005 ml


 


Balance 240 ml 1005 ml


 


  


 


Intake Oral 240 ml 480 ml


 


IV Total  525 ml


 


# Voids 3 3


 


# Bowel Movements 1 1








Objective


General Appearance:  WD/WN, no apparent distress, alert


EENT:  PERRL/EOMI, normal ENT inspection


Neck:  non-tender, normal alignment, supple, normal inspection


Cardiovascular:  normal peripheral pulses, normal rate, regular rhythm, no 

gallop/murmur, no JVD


Respiratory/Chest:  chest wall non-tender, lungs clear, normal breath sounds, 

no respiratory distress, no accessory muscle use


Abdomen:  normal bowel sounds, non tender, soft, no organomegaly, no mass


Extremities:  normal range of motion, non-tender


Neurologic:  CNs II-XII grossly normal, no motor/sensory deficits


Skin:  normal pigmentation, warm/dry





Assessment/Plan


Problem List:  


(1) HTN (hypertension)


Assessment & Plan:  Continue coreg





(2) Panic disorder


(3) Parkinson disease


Assessment & Plan:  Continue sinemet





(4) Schizoaffective disorder


Assessment & Plan:  see psych note.  Continue seroquel and entacapone.





Status:  stable











Pritesh Samuels MD Sep 4, 2018 18:15

## 2018-09-04 NOTE — DISCHARGE SUMMARY
Discharge Summary


Discharge Summary


_


DATE OF ADMISSION: 08/29/2018





DATE OF DISCHARGE: 09/01/2018.  Patient signed AGAINST MEDICAL ADVICE





REASON FOR ADMISSION: 


57 years old male with past medical history of hypertension, Parkinson disease, 

schizoaffective disorder, who lives at home with 80 years old mother,  

presented to emergency room for evaluation .


Patient was acting bizarre , stating that he believes someone placed 

formaldehyde into his mouth, while he was sleeping..  


He smell formaldehyde  on his lips upon awakening.  


He stated , that his father  passed away 2 weeks ago,  and they use 

formaldehyde for his body


He did not believe that someone wanted to poison him.  


Patient also complained  of generalized weakness.  


He denied chest pain, shortness of breath, abdominal pain.


No diarrhea , no vomiting, some nausea.  


Vital signs were stable. 


Laboratory workup revealed no leukocytosis, hemoglobin 10.5, hematocrit 31, 

potassium 3.2 .


ABG was stable on  room air.


BUN 23 creatinine 0.7.


CK 1130 , AST 55 


Serum salicylate , , Tylenol and alcohol level were all negative.  


Patient admitted with diagnoses of dehydration ,generalized weakness ,Parkinson 

disease, schizoaffective disorder ,unable to care for himself, hypokalemia.


 


CONSULTANTS:


pulmonary Dr. Wisdom


psychiatrist 


 


[facility] COURSE: 


Patient admitted to medical surgical floor.


Patient started on the IV fluids.  


Potassium was replaced.  


Psychiatric evaluation was requested. 


Patient started to work with physical and occupational therapists.  


Social service  evaluation was requested for home safety evaluation.  


Sinemet was continued.


Renal parameters and electrolytes were closely monitored.  


Electrolytes corrected as needed,  and nephrotoxins were avoided.  


Dietary evaluation was requested for probable protein calorie malnutrition.  


Psychiatrist seen and evaluated patient , and diagnosed patient with 

encephalopathy secondary to general medical condition .


Patient was  started on Seroquel on as needed basis.  


DVT prophylaxis provided.  


Pain management was addressed as needed.  


Patient decided to sign AGAINST MEDICAL ADVICE.  


The risks and consequences of signing AGAINST MEDICAL ADVICE were discussed 

with patient in detail.  Patient verbalized understanding, nevertheless signed 

AMA form and left.





FINAL DIAGNOSES: 


Dehydration 


Encephalopathy secondary to general medical condition 


Schizoaffective disorder 


Parkinson disease 


Unable to care for himself  


Probably protein calorie malnutrition 


Anemia


Hypokalemia -resolved











Danica Lacey NP Sep 4, 2018 12:28

## 2018-09-05 NOTE — PULMONOLOGY PROGRESS NOTE
Assessment/Plan


Problems:  


(1) Generalized weakness


(2) unable to care for himself 


(3) Encephalopathy


(4) Parkinson disease


(5) Schizoaffective disorder


(6) HTN (hypertension)


Assessment/Plan


c/o spasm


psych evaluation


gi to see


symptomatic treatment


monitor BP


d/w Dr lynn





Subjective


ROS Limited/Unobtainable:  No


Constitutional:  Reports: no symptoms


Allergies:  


Coded Allergies:  


     NO KNOWN ALLERGIES (Verified  Allergy, Unknown, 9/1/18)





Objective





Last 24 Hour Vital Signs








  Date Time  Temp Pulse Resp B/P (MAP) Pulse Ox O2 Delivery O2 Flow Rate FiO2


 


9/5/18 12:36  81  123/67 (85)    


 


9/5/18 09:52  83  129/74 (92)    


 


9/5/18 09:44  83  129/74    


 


9/5/18 09:00      Room Air  


 


9/5/18 08:00 98.2 82 19 123/74 (90) 99   





 98.2       


 


9/5/18 04:00 97.5 80 19 135/75 (95) 96   





 97.5       


 


9/5/18 00:00 97.3 89 19 120/65 (83) 99   





 97.3       


 


9/4/18 21:00      Room Air  


 


9/4/18 20:50  87  111/67    


 


9/4/18 20:00 98.2 87 19 111/67 (82) 99   





 98.2       


 


9/4/18 16:00 98.1 89 16 118/67 (84) 98   





 98.1       

















Intake and Output  


 


 9/4/18 9/5/18





 19:00 07:00


 


Intake Total 1250 ml 480 ml


 


Output Total 700 ml 600 ml


 


Balance 550 ml -120 ml


 


  


 


Intake Oral 800 ml 480 ml


 


IV Total 450 ml 


 


Output Urine Total 700 ml 600 ml


 


# Voids 7 2








General Appearance:  WD/WN


HEENT:  normocephalic, atraumatic


Cardiovascular:  regular rhythm


Abdomen:  normal bowel sounds, no organomegaly


Genitourinary:  normal external genitalia


Extremities:  no cyanosis


Neurologic/Psychiatric:  CNs II-XII grossly normal


Laboratory Tests


9/5/18 05:15: 


White Blood Count 6.6, Red Blood Count 3.75L, Hemoglobin 10.8L, Hematocrit 33.1L

, Mean Corpuscular Volume 88, Mean Corpuscular Hemoglobin 28.8, Mean 

Corpuscular Hemoglobin Concent 32.7, Red Cell Distribution Width 11.4L, 

Platelet Count 318, Mean Platelet Volume 5.3L, Neutrophils (%) (Auto) 55.9, 

Lymphocytes (%) (Auto) 32.0, Monocytes (%) (Auto) 9.5, Eosinophils (%) (Auto) 

1.9, Basophils (%) (Auto) 0.7, Sodium Level 139, Potassium Level 3.8, Chloride 

Level 105, Carbon Dioxide Level 28, Anion Gap 7, Blood Urea Nitrogen 12, 

Creatinine 0.6, Estimat Glomerular Filtration Rate > 60, Glucose Level 102, 

Calcium Level 8.9





Current Medications








 Medications


  (Trade)  Dose


 Ordered  Sig/Juno


 Route


 PRN Reason  Start Time


 Stop Time Status Last Admin


Dose Admin


 


 Acetaminophen


  (Tylenol)  650 mg  Q4H  PRN


 ORAL


 fever  9/1/18 21:45


 10/1/18 21:44   


 


 


 Al Hydroxide/Mg


 Hydroxide


  (Mylanta II)  30 ml  Q6H  PRN


 ORAL


 dyspepsia  9/1/18 21:45


 10/1/18 21:44  9/5/18 07:54


 


 


 Carbidopa/Levodopa


  (Sinemet 10/100)  1 tab  FIVE TIMES A  DAY


 ORAL


   9/2/18 16:00


 10/2/18 15:59  9/5/18 13:39


 


 


 Carvedilol


  (Coreg)  6.25 mg  EVERY 12  HOURS


 ORAL


   9/2/18 09:00


 10/2/18 08:59  9/5/18 09:44


 


 


 Clonazepam


  (KlonoPIN)  0.5 mg  BID


 ORAL


   9/2/18 09:00


 9/9/18 08:59  9/5/18 09:40


 


 


 Dextrose


  (Dextrose 50%)  25 ml  STAT  PRN


 IV


 Hypoglycemia  9/1/18 21:45


 10/1/18 21:44   


 


 


 Dextrose


  (Dextrose 50%)  50 ml  STAT  PRN


 IV


 Hypoglycemia  9/1/18 21:45


 10/1/18 21:44   


 


 


 Entacapone


  (Comtan)  200 mg  DAILY


 ORAL


   9/2/18 09:00


 10/2/18 08:59  9/5/18 10:27


 


 


 Heparin Sodium


  (Porcine)


  (Heparin 5000


 units/ml)  5,000 units  EVERY 12  HOURS


 SUBQ


   9/2/18 21:00


 10/2/18 20:59  9/5/18 09:45


 


 


 Lorazepam


  (Ativan 2mg/ml


 1ml)  0.5 mg  Q4H  PRN


 IV


 For Anxiety  9/1/18 21:45


 9/8/18 21:44  9/5/18 08:51


 


 


 Methocarbamol


  (Robaxin)  750 mg  TID  PRN


 ORAL


 Muscle Spasm  9/2/18 23:45


 10/2/18 23:44  9/5/18 12:30


 


 


 Morphine Sulfate


  (Morphine


 Sulfate)  1 mg  Q4H  PRN


 IVP


 For Pain  9/1/18 21:45


 9/8/18 21:44  9/3/18 14:15


 


 


 Ondansetron HCl


  (Zofran)  4 mg  Q6H  PRN


 IVP


 Nausea & Vomiting  9/1/18 21:45


 10/1/18 21:44   


 


 


 Pantoprazole


  (Protonix)  40 mg  DAILY


 ORAL


   9/2/18 09:00


 10/2/18 08:59  9/5/18 09:39


 


 


 Polyethylene


 Glycol


  (Miralax)  17 gm  HSPRN  PRN


 ORAL


 Constipation  9/1/18 21:45


 10/1/18 21:44   


 


 


 Quetiapine


 Fumarate


  (SEROquel)  50 mg  DAILY


 ORAL


   9/2/18 09:00


 10/2/18 08:59  9/2/18 10:07


 


 


 Sodium Chloride  1,000 ml @ 


 50 mls/hr  Q20H


 IV


   9/3/18 20:00


 10/3/18 19:59  9/5/18 12:33


 


 


 Zolpidem Tartrate


  (Ambien)  5 mg  HSPRN  PRN


 ORAL


 Insomnia  9/1/18 21:45


 9/8/18 21:44  9/3/18 02:31


 

















Bala Wisdom MD Sep 5, 2018 13:53

## 2018-09-05 NOTE — INTERNAL MED PROGRESS NOTE
Subjective


Date of Service:  Sep 5, 2018


Physician Name


Pritesh Samuels


Attending Physician


Salvador Real MD





Current Medications








 Medications


  (Trade)  Dose


 Ordered  Sig/Juno


 Route


 PRN Reason  Start Time


 Stop Time Status Last Admin


Dose Admin


 


 Acetaminophen


  (Tylenol)  650 mg  Q4H  PRN


 ORAL


 fever  9/1/18 21:45


 10/1/18 21:44   


 


 


 Al Hydroxide/Mg


 Hydroxide


  (Mylanta II)  30 ml  Q6H  PRN


 ORAL


 dyspepsia  9/1/18 21:45


 10/1/18 21:44  9/5/18 07:54


 


 


 Carbidopa/Levodopa


  (Sinemet 10/100)  1 tab  FIVE TIMES A  DAY


 ORAL


   9/2/18 16:00


 10/2/18 15:59  9/5/18 16:43


 


 


 Carvedilol


  (Coreg)  6.25 mg  EVERY 12  HOURS


 ORAL


   9/2/18 09:00


 10/2/18 08:59  9/5/18 09:44


 


 


 Clonazepam


  (KlonoPIN)  0.5 mg  BID


 ORAL


   9/2/18 09:00


 9/9/18 08:59  9/5/18 09:40


 


 


 Dextrose


  (Dextrose 50%)  25 ml  STAT  PRN


 IV


 Hypoglycemia  9/1/18 21:45


 10/1/18 21:44   


 


 


 Dextrose


  (Dextrose 50%)  50 ml  STAT  PRN


 IV


 Hypoglycemia  9/1/18 21:45


 10/1/18 21:44   


 


 


 Entacapone


  (Comtan)  200 mg  DAILY


 ORAL


   9/2/18 09:00


 10/2/18 08:59  9/5/18 10:27


 


 


 Heparin Sodium


  (Porcine)


  (Heparin 5000


 units/ml)  5,000 units  EVERY 12  HOURS


 SUBQ


   9/2/18 21:00


 10/2/18 20:59  9/5/18 09:45


 


 


 Lorazepam


  (Ativan 2mg/ml


 1ml)  0.5 mg  Q4H  PRN


 IV


 For Anxiety  9/1/18 21:45


 9/8/18 21:44  9/5/18 14:50


 


 


 Methocarbamol


  (Robaxin)  750 mg  TID  PRN


 ORAL


 Muscle Spasm  9/2/18 23:45


 10/2/18 23:44  9/5/18 12:30


 


 


 Morphine Sulfate


  (Morphine


 Sulfate)  1 mg  Q4H  PRN


 IVP


 For Pain  9/1/18 21:45


 9/8/18 21:44  9/3/18 14:15


 


 


 Ondansetron HCl


  (Zofran)  4 mg  Q6H  PRN


 IVP


 Nausea & Vomiting  9/1/18 21:45


 10/1/18 21:44   


 


 


 Pantoprazole


  (Protonix)  40 mg  DAILY


 ORAL


   9/2/18 09:00


 10/2/18 08:59  9/5/18 09:39


 


 


 Paroxetine HCl


  (Paxil)  20 mg  BEDTIME


 ORAL


   9/5/18 21:00


 10/5/18 20:59   


 


 


 Polyethylene


 Glycol


  (Miralax)  17 gm  HSPRN  PRN


 ORAL


 Constipation  9/1/18 21:45


 10/1/18 21:44   


 


 


 Quetiapine


 Fumarate


  (SEROquel)  50 mg  DAILY


 ORAL


   9/2/18 09:00


 10/2/18 08:59  9/2/18 10:07


 


 


 Sodium Chloride  1,000 ml @ 


 50 mls/hr  Q20H


 IV


   9/3/18 20:00


 10/3/18 19:59  9/5/18 12:33


 


 


 Zolpidem Tartrate


  (Ambien)  5 mg  HSPRN  PRN


 ORAL


 Insomnia  9/1/18 21:45


 9/8/18 21:44  9/3/18 02:31


 








Allergies:  


Coded Allergies:  


     NO KNOWN ALLERGIES (Verified  Allergy, Unknown, 9/1/18)


ROS Limited/Unobtainable:  No


Constitutional:  Reports: no symptoms


HEENT:  Reports: no symptoms


Cardiovascular:  Reports: no symptoms


Respiratory:  Reports: no symptoms


Gastrointestinal/Abdominal:  Reports: no symptoms


Genitourinary:  Reports: no symptoms


Neurologic/Psychiatric:  Reports: no symptoms


Subjective


58 YO M with schizoaffective D/O admitted with panic attack.  Cover tor Int Med 

Dr Real.  Await discharge home





Objective





Last Vital Signs








  Date Time  Temp Pulse Resp B/P (MAP) Pulse Ox O2 Delivery O2 Flow Rate FiO2


 


9/5/18 12:36  81  123/67 (85)    


 


9/5/18 09:00      Room Air  


 


9/5/18 08:00 98.2  19  99   





 98.2       











Laboratory Tests








Test


  9/5/18


05:15


 


White Blood Count


  6.6 K/UL


(4.8-10.8)


 


Red Blood Count


  3.75 M/UL


(4.70-6.10)  L


 


Hemoglobin


  10.8 G/DL


(14.2-18.0)  L


 


Hematocrit


  33.1 %


(42.0-52.0)  L


 


Mean Corpuscular Volume 88 FL (80-99)  


 


Mean Corpuscular Hemoglobin


  28.8 PG


(27.0-31.0)


 


Mean Corpuscular Hemoglobin


Concent 32.7 G/DL


(32.0-36.0)


 


Red Cell Distribution Width


  11.4 %


(11.6-14.8)  L


 


Platelet Count


  318 K/UL


(150-450)


 


Mean Platelet Volume


  5.3 FL


(6.5-10.1)  L


 


Neutrophils (%) (Auto)


  55.9 %


(45.0-75.0)


 


Lymphocytes (%) (Auto)


  32.0 %


(20.0-45.0)


 


Monocytes (%) (Auto)


  9.5 %


(1.0-10.0)


 


Eosinophils (%) (Auto)


  1.9 %


(0.0-3.0)


 


Basophils (%) (Auto)


  0.7 %


(0.0-2.0)


 


Sodium Level


  139 MMOL/L


(136-145)


 


Potassium Level


  3.8 MMOL/L


(3.5-5.1)


 


Chloride Level


  105 MMOL/L


()


 


Carbon Dioxide Level


  28 MMOL/L


(21-32)


 


Anion Gap


  7 mmol/L


(5-15)


 


Blood Urea Nitrogen


  12 mg/dL


(7-18)


 


Creatinine


  0.6 MG/DL


(0.55-1.30)


 


Estimat Glomerular Filtration


Rate > 60 mL/min


(>60)


 


Glucose Level


  102 MG/DL


()


 


Calcium Level


  8.9 MG/DL


(8.5-10.1)

















Intake and Output  


 


 9/4/18 9/5/18





 19:00 07:00


 


Intake Total 1250 ml 480 ml


 


Output Total 700 ml 600 ml


 


Balance 550 ml -120 ml


 


  


 


Intake Oral 800 ml 480 ml


 


IV Total 450 ml 


 


Output Urine Total 700 ml 600 ml


 


# Voids 7 2








Objective


General Appearance:  WD/WN, no apparent distress, alert


EENT:  PERRL/EOMI, normal ENT inspection


Neck:  non-tender, normal alignment, supple, normal inspection


Cardiovascular:  normal peripheral pulses, normal rate, regular rhythm, no 

gallop/murmur, no JVD


Respiratory/Chest:  chest wall non-tender, lungs clear, normal breath sounds, 

no respiratory distress, no accessory muscle use


Abdomen:  normal bowel sounds, non tender, soft, no organomegaly, no mass


Extremities:  normal range of motion, non-tender


Neurologic:  CNs II-XII grossly normal, no motor/sensory deficits


Skin:  normal pigmentation, warm/dry





Assessment/Plan


Problem List:  


(1) HTN (hypertension)


Assessment & Plan:  Continue coreg





(2) Panic disorder


(3) Parkinson disease


Assessment & Plan:  Continue sinemet





(4) Schizoaffective disorder


Assessment & Plan:  see psych note.  Continue seroquel and entacapone.





Status:  stable


Assessment/Plan


REfused admission to skilled nursing fac and psych unit.  Discharge home today











Pritesh Samuels MD Sep 5, 2018 17:06

## 2018-09-05 NOTE — CARDIOLOGY REPORT
--------------- APPROVED REPORT --------------





EKG Measurement

Heart [id number]

[id number]

[id number]

[id number]

[id number]





Normal sinus rhythm

Normal ECG

## 2018-09-05 NOTE — GENERAL PROGRESS NOTE
Assessment/Plan


Status:  stable


Assessment/Plan


panic attack


mdd





paxil 20mg qhs


ativan prn


seroquel 25mg qam





Subjective


Date patient seen:  Sep 5, 2018


Constitutional:  Reports: malaise, weakness


Neurologic/Psychiatric:  Reports: anxiety, depressed, emotional problems, 

headache


Allergies:  


Coded Allergies:  


     NO KNOWN ALLERGIES (Verified  Allergy, Unknown, 9/1/18)





Objective





Last 24 Hour Vital Signs








  Date Time  Temp Pulse Resp B/P (MAP) Pulse Ox O2 Delivery O2 Flow Rate FiO2


 


9/5/18 20:00 98.4 79 19 98/57 (71) 100   





 98.4       


 


9/5/18 16:00 98.1 86 20 124/73 (90) 98   





 98.1       


 


9/5/18 12:36  81  123/67 (85)    


 


9/5/18 09:52  83  129/74 (92)    


 


9/5/18 09:44  83  129/74    


 


9/5/18 09:00      Room Air  


 


9/5/18 08:00 98.2 82 19 123/74 (90) 99   





 98.2       


 


9/5/18 04:00 97.5 80 19 135/75 (95) 96   





 97.5       


 


9/5/18 00:00 97.3 89 19 120/65 (83) 99   





 97.3       

















Intake and Output  


 


 9/4/18 9/5/18





 19:00 07:00


 


Intake Total 1250 ml 480 ml


 


Output Total 700 ml 600 ml


 


Balance 550 ml -120 ml


 


  


 


Intake Oral 800 ml 480 ml


 


IV Total 450 ml 


 


Output Urine Total 700 ml 600 ml


 


# Voids 7 2








Laboratory Tests


9/5/18 05:15: 


White Blood Count 6.6, Red Blood Count 3.75L, Hemoglobin 10.8L, Hematocrit 33.1L

, Mean Corpuscular Volume 88, Mean Corpuscular Hemoglobin 28.8, Mean 

Corpuscular Hemoglobin Concent 32.7, Red Cell Distribution Width 11.4L, 

Platelet Count 318, Mean Platelet Volume 5.3L, Neutrophils (%) (Auto) 55.9, 

Lymphocytes (%) (Auto) 32.0, Monocytes (%) (Auto) 9.5, Eosinophils (%) (Auto) 

1.9, Basophils (%) (Auto) 0.7, Sodium Level 139, Potassium Level 3.8, Chloride 

Level 105, Carbon Dioxide Level 28, Anion Gap 7, Blood Urea Nitrogen 12, 

Creatinine 0.6, Estimat Glomerular Filtration Rate > 60, Glucose Level 102, 

Calcium Level 8.9


Height (Feet):  5


Height (Inches):  10.00


Weight (Pounds):  126


General Appearance:  no apparent distress, alert


Neurologic:  oriented x 3, responsive, depressed affect











Kristian Lima MD Sep 5, 2018 23:30

## 2018-09-05 NOTE — CARDIOLOGY REPORT
--------------- APPROVED REPORT --------------





EKG Measurement

Heart [id number]

[id number]

[id number]

[id number]

[id number]





Normal sinus rhythm

Normal ECG

## 2018-09-06 NOTE — DIAGNOSTIC IMAGING REPORT
--------------- APPROVED REPORT --------------





CPT Code: 01550



Present Symptoms

Comments: PAIN





BILATERAL: Imaging reveals a patent deep venous system bilaterally. There is no evidence 

of thrombus within the femoral, popliteal or tibial segments. The greater saphenous veins 

are also within normal limits. Doppler indicates normal spontaneous flow within these 

segments.

## 2018-09-06 NOTE — DISCHARGE SUMMARY
Discharge Summary


Discharge Summary


_


DATE OF ADMISSION: 09/01/2018


DATE OF DISCHARGE: 09/05/2018





CONSULTANTS: 


Dr. Bala Lima   





BRIEF HOSPITAL COURSE:


Patient is a 57-year-old male, who presented with chief complaint of panic 

attack.  Patient was recently admitted to [facility] couple of days 

prior but left AGAINST MEDICAL ADVICE.  He became extremely anxious and EMS was 

called.  He was transferred to [facility].  He has medical history 

significant of Parkinson's disease and is on Sinemet 3 tid.





On evaluation at ED, vital signs were stable.  He was anxious and was given 

Ativan.  Blood work did not show any leukocytosis.  Urine toxicology was 

negative.  Upon conversation with patient's family.  Patient unable to be 

safely discharged home, patient was then admitted for schizoaffective disorder 

and unable to care for self.





He was given gentle IV hydration.  He was resumed on his antiparkinson 

medications.  Venous duplex of the lower extremity was negative for DVT.





He underwent psychiatric evaluation.  He was given Paxil 20 mg daily at bedtime 

and Seroquel 25 mg in the morning.





Patient had anemia.  Blood levels were monitored.   He was given physical and 

occupational therapy.  Social service was consulted.  He refused admission to a 

skilled nursing facility and psychiatric unit.  He was eventually discharged 

home.





FINAL DIAGNOSES: 


Acute encephalopathy


Panic attack


Major depressive disorder


Schizoaffective disorder


Parkinson's disease


Generalized weakness


Unable to care for self


Hypertension








DISPOSITION: Patient was discharged home.





DISCHARGE MEDICATIONS: Refer to Discharge Medication List.





DISCHARGE INSTRUCTIONS: Follow up with PCP in a week.








I have been assigned to dictate discharge summary on this account, and I was 

not involved in the patient's management.











Alexandra Johnson NP Sep 6, 2018 14:15